# Patient Record
Sex: MALE | Race: WHITE | Employment: OTHER | URBAN - NONMETROPOLITAN AREA
[De-identification: names, ages, dates, MRNs, and addresses within clinical notes are randomized per-mention and may not be internally consistent; named-entity substitution may affect disease eponyms.]

---

## 2017-06-26 ENCOUNTER — TELEPHONE (OUTPATIENT)
Dept: FAMILY MEDICINE CLINIC | Age: 27
End: 2017-06-26

## 2017-06-29 ENCOUNTER — OFFICE VISIT (OUTPATIENT)
Dept: FAMILY MEDICINE CLINIC | Age: 27
End: 2017-06-29

## 2017-06-29 VITALS
BODY MASS INDEX: 34.5 KG/M2 | WEIGHT: 241 LBS | DIASTOLIC BLOOD PRESSURE: 70 MMHG | HEIGHT: 70 IN | HEART RATE: 70 BPM | RESPIRATION RATE: 12 BRPM | SYSTOLIC BLOOD PRESSURE: 118 MMHG | OXYGEN SATURATION: 98 % | TEMPERATURE: 97.6 F

## 2017-06-29 DIAGNOSIS — J20.9 ACUTE BRONCHITIS, UNSPECIFIED ORGANISM: ICD-10-CM

## 2017-06-29 PROCEDURE — 99213 OFFICE O/P EST LOW 20 MIN: CPT | Performed by: NURSE PRACTITIONER

## 2017-06-29 RX ORDER — METHYLPREDNISOLONE 4 MG/1
TABLET ORAL
Qty: 1 KIT | Refills: 0 | Status: SHIPPED | OUTPATIENT
Start: 2017-06-29 | End: 2017-07-05

## 2017-06-29 RX ORDER — AZITHROMYCIN 250 MG/1
TABLET, FILM COATED ORAL
Qty: 1 PACKET | Refills: 0 | Status: SHIPPED | OUTPATIENT
Start: 2017-06-29 | End: 2017-07-09

## 2017-06-29 RX ORDER — ALBUTEROL SULFATE 90 UG/1
2 AEROSOL, METERED RESPIRATORY (INHALATION) EVERY 6 HOURS PRN
Qty: 1 INHALER | Refills: 0 | Status: SHIPPED | OUTPATIENT
Start: 2017-06-29 | End: 2021-07-15

## 2017-06-29 ASSESSMENT — PATIENT HEALTH QUESTIONNAIRE - PHQ9
2. FEELING DOWN, DEPRESSED OR HOPELESS: 0
SUM OF ALL RESPONSES TO PHQ9 QUESTIONS 1 & 2: 0
SUM OF ALL RESPONSES TO PHQ QUESTIONS 1-9: 0
1. LITTLE INTEREST OR PLEASURE IN DOING THINGS: 0

## 2018-07-12 ENCOUNTER — OFFICE VISIT (OUTPATIENT)
Dept: FAMILY MEDICINE CLINIC | Age: 28
End: 2018-07-12
Payer: COMMERCIAL

## 2018-07-12 VITALS
HEART RATE: 78 BPM | HEIGHT: 70 IN | DIASTOLIC BLOOD PRESSURE: 72 MMHG | WEIGHT: 245 LBS | BODY MASS INDEX: 35.07 KG/M2 | SYSTOLIC BLOOD PRESSURE: 120 MMHG

## 2018-07-12 DIAGNOSIS — L23.7 POISON IVY: Primary | ICD-10-CM

## 2018-07-12 DIAGNOSIS — B35.4 TINEA CORPORIS: ICD-10-CM

## 2018-07-12 PROCEDURE — 99213 OFFICE O/P EST LOW 20 MIN: CPT | Performed by: NURSE PRACTITIONER

## 2018-07-12 RX ORDER — CLOTRIMAZOLE AND BETAMETHASONE DIPROPIONATE 10; .64 MG/G; MG/G
CREAM TOPICAL
Qty: 45 G | Refills: 0 | Status: SHIPPED | OUTPATIENT
Start: 2018-07-12 | End: 2021-07-15

## 2018-07-12 ASSESSMENT — PATIENT HEALTH QUESTIONNAIRE - PHQ9
SUM OF ALL RESPONSES TO PHQ9 QUESTIONS 1 & 2: 0
1. LITTLE INTEREST OR PLEASURE IN DOING THINGS: 0
SUM OF ALL RESPONSES TO PHQ QUESTIONS 1-9: 0
2. FEELING DOWN, DEPRESSED OR HOPELESS: 0

## 2018-07-12 ASSESSMENT — ENCOUNTER SYMPTOMS
GASTROINTESTINAL NEGATIVE: 1
RESPIRATORY NEGATIVE: 1
EYES NEGATIVE: 1
COLOR CHANGE: 1

## 2018-07-12 NOTE — PROGRESS NOTES
rales.   Abdominal: Soft. Bowel sounds are normal. There is no tenderness. There is no guarding. Musculoskeletal: Normal range of motion. Lymphadenopathy:     He has no cervical adenopathy. Neurological: He is alert and oriented to person, place, and time. He has normal reflexes. Skin: Skin is warm. Psychiatric: He has a normal mood and affect. Assessment:       Diagnosis Orders   1. Poison ivy  clotrimazole-betamethasone (LOTRISONE) 1-0.05 % cream   2. Tinea corporis  clotrimazole-betamethasone (LOTRISONE) 1-0.05 % cream           Plan:      Can use lotrisone on both  Advised to call back directly if there are further questions, or if these symptoms fail to improve as anticipated or worsen.

## 2020-03-23 ENCOUNTER — TELEPHONE (OUTPATIENT)
Dept: FAMILY MEDICINE CLINIC | Age: 30
End: 2020-03-23

## 2020-03-23 RX ORDER — AMOXICILLIN 500 MG/1
500 CAPSULE ORAL 3 TIMES DAILY
Qty: 30 CAPSULE | Refills: 0 | Status: SHIPPED | OUTPATIENT
Start: 2020-03-23 | End: 2020-04-02

## 2020-03-23 NOTE — TELEPHONE ENCOUNTER
Patient is calling asking for something to be phoned in to [facility] in [facility] from [place], he is coughing, green drainage, runny nose, NO fever.  Please advise to patient at [phone]

## 2020-03-23 NOTE — TELEPHONE ENCOUNTER
Patient called back and was informed that the prescription was sent to the pharmacy.  Patient voiced understanding

## 2020-10-02 ENCOUNTER — VIRTUAL VISIT (OUTPATIENT)
Dept: FAMILY MEDICINE CLINIC | Age: 30
End: 2020-10-02
Payer: COMMERCIAL

## 2020-10-02 PROCEDURE — 99213 OFFICE O/P EST LOW 20 MIN: CPT | Performed by: NURSE PRACTITIONER

## 2020-10-02 RX ORDER — PREDNISONE 1 MG/1
TABLET ORAL
Qty: 45 TABLET | Refills: 0 | Status: SHIPPED | OUTPATIENT
Start: 2020-10-02 | End: 2020-10-12

## 2020-10-02 ASSESSMENT — ENCOUNTER SYMPTOMS
RESPIRATORY NEGATIVE: 1
EYES NEGATIVE: 1
GASTROINTESTINAL NEGATIVE: 1

## 2020-10-02 NOTE — PROGRESS NOTES
10/2/2020    TELEHEALTH EVALUATION -- Audio/Visual (During YHRCI-69 public health emergency)    HPI:    Sancho Gutierrez (:  1990) has requested an audio/video evaluation for the following concern(s):    Pt had visit due to persistent runny nose congestion and cough for 2 days. He does not feel ill. Tried mucinex without relief. States nose just keeps running      Review of Systems   Constitutional: Negative. HENT: Positive for congestion. Eyes: Negative. Respiratory: Negative. Cardiovascular: Negative. Gastrointestinal: Negative. Musculoskeletal: Negative. Skin: Negative. Neurological: Negative. Prior to Visit Medications    Medication Sig Taking? Authorizing Provider   predniSONE (DELTASONE) 5 MG tablet 9 tabs po day 1 taper by 5mg daily Yes MURIEL Rivera CNP   clotrimazole-betamethasone (LOTRISONE) 1-0.05 % cream Apply topically 2 times daily.   MURIEL Rivera CNP   albuterol sulfate  (90 Base) MCG/ACT inhaler Inhale 2 puffs into the lungs every 6 hours as needed for Wheezing  MURIEL Rivera CNP       Social History     Tobacco Use    Smoking status: Never Smoker    Smokeless tobacco: Current User   Substance Use Topics    Alcohol use: Not on file    Drug use: Not on file        No Known Allergies, No past medical history on file.,   Past Surgical History:   Procedure Laterality Date    SINUS SURGERY      TONSILLECTOMY     ,   Social History     Tobacco Use    Smoking status: Never Smoker    Smokeless tobacco: Current User   Substance Use Topics    Alcohol use: Not on file    Drug use: Not on file   ,   Family History   Problem Relation Age of Onset    Diabetes Mother     Cancer Paternal Grandfather    ,   There is no immunization history on file for this patient.,   Health Maintenance   Topic Date Due    Varicella vaccine (1 of 2 - 2-dose childhood series) 1990    HIV screen  2004    DTaP/Tdap/Td vaccine (1 - Tdap) 05/17/2008    Flu vaccine (1) 09/01/2020    Hepatitis A vaccine  Aged Out    Hepatitis B vaccine  Aged Out    Hib vaccine  Aged Out    Meningococcal (ACWY) vaccine  Aged Out    Pneumococcal 0-64 years Vaccine  Aged Out       PHYSICAL EXAMINATION:  [ INSTRUCTIONS:  \"[x]\" Indicates a positive item  \"[]\" Indicates a negative item  -- DELETE ALL ITEMS NOT EXAMINED]  Vital Signs: (As obtained by patient/caregiver or practitioner observation)    Blood pressure-  Heart rate-    Respiratory rate-    Temperature-  Pulse oximetry-     Constitutional: [x] Appears well-developed and well-nourished [] No apparent distress      [] Abnormal-   Mental status  [x] Alert and awake  [] Oriented to person/place/time []Able to follow commands      Eyes:  EOM    []  Normal  [] Abnormal-  Sclera  []  Normal  [] Abnormal -         Discharge []  None visible  [] Abnormal -    HENT:   [x] Normocephalic, atraumatic. [] Abnormal   [] Mouth/Throat: Mucous membranes are moist.     External Ears [] Normal  [] Abnormal-     Neck: [] No visualized mass     Pulmonary/Chest: [x] Respiratory effort normal.  [x] No visualized signs of difficulty breathing or respiratory distress        [] Abnormal-      Musculoskeletal:   [x] Normal gait with no signs of ataxia         [] Normal range of motion of neck        [] Abnormal-       Neurological:        [x] No Facial Asymmetry (Cranial nerve 7 motor function) (limited exam to video visit)          [] No gaze palsy        [] Abnormal-         Skin:        [] No significant exanthematous lesions or discoloration noted on facial skin         [] Abnormal-            Psychiatric:       [] Normal Affect [] No Hallucinations        [] Abnormal-     Other pertinent observable physical exam findings-     ASSESSMENT/PLAN:  1. Seasonal allergic rhinitis due to pollen  His symptoms suggest allergic rhinitis.   Will treat with brief course of prednisone and he can take an antihistamine  Advised to call back directly if there are further questions, or if these symptoms fail to improve as anticipated or worsen. - predniSONE (DELTASONE) 5 MG tablet; 9 tabs po day 1 taper by 5mg daily  Dispense: 45 tablet; Refill: 0      No follow-ups on file. Neeraj Ospina is a 31 y.o. male being evaluated by a Virtual Visit (video visit) encounter to address concerns as mentioned above. A caregiver was present when appropriate. Due to this being a TeleHealth encounter (During CBSTY-10 public health emergency), evaluation of the following organ systems was limited: Vitals/Constitutional/EENT/Resp/CV/GI//MS/Neuro/Skin/Heme-Lymph-Imm. Pursuant to the emergency declaration under the [address] [place] [address] authority and the [employer] and [employer] Act, this Virtual Visit was conducted with patient's (and/or legal guardian's) consent, to reduce the patient's risk of exposure to COVID-19 and provide necessary medical care. The patient (and/or legal guardian) has also been advised to contact this office for worsening conditions or problems, and seek emergency medical treatment and/or call 911 if deemed necessary. Patient identification was verified at the start of the visit: Yes    Total time spent on this encounter: 15min    Services were provided through a video synchronous discussion virtually to substitute for in-person clinic visit. Patient and provider were located at their individual homes. --MURIEL Pizano - CNP on 10/2/2020 at 12:22 PM    An electronic signature was used to authenticate this note.

## 2021-06-03 ENCOUNTER — TELEPHONE (OUTPATIENT)
Dept: FAMILY MEDICINE CLINIC | Age: 31
End: 2021-06-03

## 2021-06-03 ENCOUNTER — NURSE TRIAGE (OUTPATIENT)
Dept: OTHER | Facility: CLINIC | Age: 31
End: 2021-06-03
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
## 2021-06-03 NOTE — TELEPHONE ENCOUNTER
I contacted pt for more information. Lump  Has been there for 3 weeks. Pt feels like it is a hernia. On right side of belly button. See notes.

## 2021-06-09 ENCOUNTER — OFFICE VISIT (OUTPATIENT)
Dept: FAMILY MEDICINE CLINIC | Age: 31
End: 2021-06-09
Payer: COMMERCIAL

## 2021-06-09 VITALS
BODY MASS INDEX: 35.07 KG/M2 | RESPIRATION RATE: 18 BRPM | HEIGHT: 70 IN | DIASTOLIC BLOOD PRESSURE: 76 MMHG | WEIGHT: 245 LBS | SYSTOLIC BLOOD PRESSURE: 128 MMHG | HEART RATE: 72 BPM | OXYGEN SATURATION: 97 %

## 2021-06-09 DIAGNOSIS — J34.89 SORE IN NOSE: ICD-10-CM

## 2021-06-09 DIAGNOSIS — K43.9 VENTRAL HERNIA WITHOUT OBSTRUCTION OR GANGRENE: Primary | ICD-10-CM

## 2021-06-09 DIAGNOSIS — D16.9 OSTEOCHONDROMA: ICD-10-CM

## 2021-06-09 DIAGNOSIS — M25.531 RIGHT WRIST PAIN: ICD-10-CM

## 2021-06-09 PROCEDURE — 99214 OFFICE O/P EST MOD 30 MIN: CPT | Performed by: NURSE PRACTITIONER

## 2021-06-09 SDOH — ECONOMIC STABILITY: FOOD INSECURITY: WITHIN THE PAST 12 MONTHS, YOU WORRIED THAT YOUR FOOD WOULD RUN OUT BEFORE YOU GOT MONEY TO BUY MORE.: NEVER TRUE

## 2021-06-09 SDOH — ECONOMIC STABILITY: FOOD INSECURITY: WITHIN THE PAST 12 MONTHS, THE FOOD YOU BOUGHT JUST DIDN'T LAST AND YOU DIDN'T HAVE MONEY TO GET MORE.: NEVER TRUE

## 2021-06-09 ASSESSMENT — SOCIAL DETERMINANTS OF HEALTH (SDOH): HOW HARD IS IT FOR YOU TO PAY FOR THE VERY BASICS LIKE FOOD, HOUSING, MEDICAL CARE, AND HEATING?: NOT HARD AT ALL

## 2021-06-09 ASSESSMENT — PATIENT HEALTH QUESTIONNAIRE - PHQ9
SUM OF ALL RESPONSES TO PHQ9 QUESTIONS 1 & 2: 0
SUM OF ALL RESPONSES TO PHQ QUESTIONS 1-9: 0
2. FEELING DOWN, DEPRESSED OR HOPELESS: 0
SUM OF ALL RESPONSES TO PHQ QUESTIONS 1-9: 0
SUM OF ALL RESPONSES TO PHQ QUESTIONS 1-9: 0
1. LITTLE INTEREST OR PLEASURE IN DOING THINGS: 0

## 2021-06-09 ASSESSMENT — ENCOUNTER SYMPTOMS
EYES NEGATIVE: 1
GASTROINTESTINAL NEGATIVE: 1
COLOR CHANGE: 1
RESPIRATORY NEGATIVE: 1

## 2021-06-09 NOTE — PROGRESS NOTES
Respiratory: Negative. Cardiovascular: Negative. Gastrointestinal: Negative. Musculoskeletal: Positive for myalgias. Skin: Positive for color change and wound. Neurological: Negative. Objective:     Vitals:    06/09/21 1308   BP: 128/76   Site: Left Upper Arm   Position: Sitting   Cuff Size: Medium Adult   Pulse: 72   Resp: 18   SpO2: 97%   Weight: 245 lb (111.1 kg)   Height: 5' 10\" (1.778 m)       Physical Exam  Constitutional:       Appearance: He is well-developed. HENT:      Head: Normocephalic. Right Ear: Tympanic membrane normal.      Left Ear: Tympanic membrane normal.      Ears:        Nose: Nose normal.     Cardiovascular:      Rate and Rhythm: Normal rate and regular rhythm. Heart sounds: Normal heart sounds. No murmur heard. No friction rub. No gallop. Pulmonary:      Effort: Pulmonary effort is normal.      Breath sounds: Normal breath sounds. No wheezing or rales. Abdominal:      General: Bowel sounds are normal.      Palpations: Abdomen is soft. Tenderness: There is no abdominal tenderness. There is no guarding. Musculoskeletal:         General: Normal range of motion. Cervical back: Normal range of motion and neck supple. Lymphadenopathy:      Cervical: No cervical adenopathy. Skin:     General: Skin is warm. Neurological:      Mental Status: He is alert and oriented to person, place, and time. Deep Tendon Reflexes: Reflexes are normal and symmetric. Assessment:      Diagnosis Orders   1. Ventral hernia without obstruction or gangrene     2. Right wrist pain     3. Osteochondroma     4. Sore in nose         Plan:      No follow-ups on file. No orders of the defined types were placed in this encounter. No orders of the defined types were placed in this encounter. With abdomen. Recommend to focus on weight loss and will monitor  For wrist recommend wrist brace  For ear.   Stop using qtip  Try vaseline on nose lesion    Patient given educational materials - seepatient instructions. Discussed use, benefit, and side effects of prescribed medications. All patient questions answered. Pt voiced understanding. Patient agreed withtreatment plan. Follow up as directed.      Electronically signed by MURIEL Soriano CNP on 6/9/2021 at 5:45 PM

## 2021-07-11 ENCOUNTER — APPOINTMENT (OUTPATIENT)
Dept: GENERAL RADIOLOGY | Age: 31
End: 2021-07-11
Payer: COMMERCIAL

## 2021-07-11 ENCOUNTER — HOSPITAL ENCOUNTER (EMERGENCY)
Age: 31
Discharge: HOME | End: 2021-07-11
Attending: EMERGENCY MEDICINE
Payer: COMMERCIAL

## 2021-07-11 VITALS
OXYGEN SATURATION: 96 % | SYSTOLIC BLOOD PRESSURE: 140 MMHG | DIASTOLIC BLOOD PRESSURE: 82 MMHG | TEMPERATURE: 100.8 F | WEIGHT: 240 LBS | BODY MASS INDEX: 34.44 KG/M2 | HEART RATE: 89 BPM | RESPIRATION RATE: 18 BRPM

## 2021-07-11 DIAGNOSIS — J01.90 ACUTE SINUSITIS, RECURRENCE NOT SPECIFIED, UNSPECIFIED LOCATION: Primary | ICD-10-CM

## 2021-07-11 LAB
ALBUMIN SERPL-MCNC: 4.3 G/DL (ref 3.5–5.1)
ALP BLD-CCNC: 84 U/L (ref 38–126)
ALT SERPL-CCNC: 58 U/L (ref 11–66)
ANION GAP SERPL CALCULATED.3IONS-SCNC: 13 MEQ/L (ref 8–16)
AST SERPL-CCNC: 56 U/L (ref 5–40)
BASOPHILS # BLD: 1.1 %
BASOPHILS ABSOLUTE: 0 THOU/MM3 (ref 0–0.1)
BILIRUB SERPL-MCNC: 0.8 MG/DL (ref 0.3–1.2)
BUN BLDV-MCNC: 11 MG/DL (ref 7–22)
CALCIUM SERPL-MCNC: 9 MG/DL (ref 8.5–10.5)
CHLORIDE BLD-SCNC: 100 MEQ/L (ref 98–111)
CO2: 22 MEQ/L (ref 23–33)
CREAT SERPL-MCNC: 1.1 MG/DL (ref 0.4–1.2)
EOSINOPHIL # BLD: 0 %
EOSINOPHILS ABSOLUTE: 0 THOU/MM3 (ref 0–0.4)
ERYTHROCYTE [DISTWIDTH] IN BLOOD BY AUTOMATED COUNT: 11.9 % (ref 11.5–14.5)
ERYTHROCYTE [DISTWIDTH] IN BLOOD BY AUTOMATED COUNT: 36.3 FL (ref 35–45)
FLU A ANTIGEN: NEGATIVE
FLU B ANTIGEN: NEGATIVE
GFR SERPL CREATININE-BSD FRML MDRD: 78 ML/MIN/1.73M2
GLUCOSE BLD-MCNC: 100 MG/DL (ref 70–108)
HCT VFR BLD CALC: 47.8 % (ref 42–52)
HEMOGLOBIN: 16.9 GM/DL (ref 14–18)
IMMATURE GRANS (ABS): 0.04 THOU/MM3 (ref 0–0.07)
IMMATURE GRANULOCYTES: 1.5 %
LYMPHOCYTES # BLD: 12.6 %
LYMPHOCYTES ABSOLUTE: 0.3 THOU/MM3 (ref 1–4.8)
MCH RBC QN AUTO: 30 PG (ref 26–33)
MCHC RBC AUTO-ENTMCNC: 35.4 GM/DL (ref 32.2–35.5)
MCV RBC AUTO: 84.9 FL (ref 80–94)
MONOCYTES # BLD: 13.4 %
MONOCYTES ABSOLUTE: 0.3 THOU/MM3 (ref 0.4–1.3)
NUCLEATED RED BLOOD CELLS: 0 /100 WBC
OSMOLALITY CALCULATION: 269.6 MOSMOL/KG (ref 275–300)
PLATELET # BLD: 131 THOU/MM3 (ref 130–400)
PMV BLD AUTO: 11 FL (ref 9.4–12.4)
POTASSIUM REFLEX MAGNESIUM: 3.9 MEQ/L (ref 3.5–5.2)
RBC # BLD: 5.63 MILL/MM3 (ref 4.7–6.1)
SARS-COV-2, NAAT: NOT DETECTED
SEG NEUTROPHILS: 71.4 %
SEGMENTED NEUTROPHILS ABSOLUTE COUNT: 1.9 THOU/MM3 (ref 1.8–7.7)
SODIUM BLD-SCNC: 135 MEQ/L (ref 135–145)
TOTAL PROTEIN: 7 G/DL (ref 6.1–8)
WBC # BLD: 2.6 THOU/MM3 (ref 4.8–10.8)

## 2021-07-11 PROCEDURE — 80053 COMPREHEN METABOLIC PANEL: CPT

## 2021-07-11 PROCEDURE — 85025 COMPLETE CBC W/AUTO DIFF WBC: CPT

## 2021-07-11 PROCEDURE — 71045 X-RAY EXAM CHEST 1 VIEW: CPT

## 2021-07-11 PROCEDURE — 87635 SARS-COV-2 COVID-19 AMP PRB: CPT

## 2021-07-11 PROCEDURE — 36415 COLL VENOUS BLD VENIPUNCTURE: CPT

## 2021-07-11 PROCEDURE — 87804 INFLUENZA ASSAY W/OPTIC: CPT

## 2021-07-11 PROCEDURE — 6370000000 HC RX 637 (ALT 250 FOR IP): Performed by: EMERGENCY MEDICINE

## 2021-07-11 PROCEDURE — 99283 EMERGENCY DEPT VISIT LOW MDM: CPT

## 2021-07-11 RX ORDER — FLUTICASONE PROPIONATE 50 MCG
1 SPRAY, SUSPENSION (ML) NASAL DAILY
Qty: 1 BOTTLE | Refills: 0 | Status: SHIPPED | OUTPATIENT
Start: 2021-07-11 | End: 2021-07-15

## 2021-07-11 RX ORDER — ACETAMINOPHEN 500 MG
1000 TABLET ORAL ONCE
Status: COMPLETED | OUTPATIENT
Start: 2021-07-11 | End: 2021-07-11

## 2021-07-11 RX ORDER — IBUPROFEN 600 MG/1
600 TABLET ORAL 3 TIMES DAILY
Qty: 30 TABLET | Refills: 0 | Status: SHIPPED | OUTPATIENT
Start: 2021-07-11 | End: 2021-07-15

## 2021-07-11 RX ORDER — OXYMETAZOLINE HYDROCHLORIDE 0.05 G/100ML
2 SPRAY NASAL 2 TIMES DAILY
Status: DISCONTINUED | OUTPATIENT
Start: 2021-07-11 | End: 2021-07-11 | Stop reason: HOSPADM

## 2021-07-11 RX ADMIN — OXYMETAZOLINE HYDROCHLORIDE 2 SPRAY: 0.05 SPRAY NASAL at 19:46

## 2021-07-11 RX ADMIN — ACETAMINOPHEN 1000 MG: 500 TABLET ORAL at 18:51

## 2021-07-11 NOTE — ED NOTES
Patient to ED with complaints of cough and fatigue for past few days. Patient states he felt like this before when he had pneumonia. Patient is resting in bed with easy and unlabored respirations. Call light in reach. Side rails up x2. Patient denies further complaints or concerns. Will monitor.         Gabriel Crawford RN  07/11/21 7671

## 2021-07-11 NOTE — ED NOTES
Bedside report received from [facility]. Pt resting on cot RR even and unlabored. Pt denies needs at this time.       Karen Das RN  07/11/21 1918

## 2021-07-11 NOTE — ED NOTES
ED nurse-to-nurse bedside report    Chief Complaint   Patient presents with    Cough      LOC: alert and orientated to name, place, date  Vital signs   Vitals:    07/11/21 1812 07/11/21 1918   BP: 138/85 (!) 140/82   Pulse: 91 89   Resp: 18 18   Temp: 100.8 °F (38.2 °C)    TempSrc: Oral    SpO2: 95% 96%   Weight: 240 lb (108.9 kg)       Pain:    Pain Interventions: N/a  Pain Goal: n/a  Oxygen: NA    Current needs required room air   Telemetry: NA  LDAs:    Continuous Infusions:   Mobility: Independent  Alvarez Fall Risk Score: No flowsheet data found.   Fall Interventions: Side rails up x2, call light in reach  Report given to: [facility], RN  07/11/21 1924

## 2021-07-12 ENCOUNTER — TELEPHONE (OUTPATIENT)
Dept: FAMILY MEDICINE CLINIC | Age: 31
End: 2021-07-12

## 2021-07-12 ASSESSMENT — ENCOUNTER SYMPTOMS
BACK PAIN: 0
PHOTOPHOBIA: 0
COUGH: 1
ABDOMINAL PAIN: 0
SINUS PRESSURE: 1
COLOR CHANGE: 0
NAUSEA: 0
CHEST TIGHTNESS: 0
VOMITING: 0
EYE REDNESS: 0
SORE THROAT: 0
RHINORRHEA: 1
SINUS PAIN: 1

## 2021-07-12 NOTE — TELEPHONE ENCOUNTER
Start the medication for the sinus infection  If not better after a week then call for appontment  Call patient

## 2021-07-12 NOTE — TELEPHONE ENCOUNTER
----- Message from Jada Gibson sent at 7/12/2021  4:00 PM EDT -----  Subject: Message to Provider    QUESTIONS  Information for Provider? Wife Asim Byrd called patient seen in the ER on   7/11/2021 seen for a sinus infection. She does not feel as if his symptoms   match up with a sinus infection. His white blood cell count is low and he   will not get out of bed also states he has a head ache.   ---------------------------------------------------------------------------  --------------  CALL BACK INFO  What is the best way for the office to contact you? OK to leave message on   voicemail  Preferred Call Back Phone Number? [phone]  ---------------------------------------------------------------------------  --------------  SCRIPT ANSWERS  Relationship to Patient? Other  Representative Name? Asim Byrd - Wife   Is the Representative on the appropriate HIPAA document in Epic?  Yes

## 2021-07-13 NOTE — ED PROVIDER NOTES
[place] ENCOUNTER          Pt Name: Jessie Acevedo  MRN: [id number]  [place] 1990  Date of evaluation: 7/11/2021  Emergency Physician: Mathew Golden, [address] [facility]       Chief Complaint   Patient presents with    Cough     History obtained from the patient. HISTORY OF PRESENT ILLNESS    HPI  Jessie Acevedo is a 27 y.o. male who presents to the emergency department for evaluation of cough. Patient with nasal congestion sinus pressure and cough. His symptoms have been ongoing over the last 3 days. His cough is productive of clear sputum. He reports associated body aches chills without fever. Nothing makes his symptoms better or worse. No home treatments tried. The patient has no other acute complaints at this time. REVIEW OF SYSTEMS   Review of Systems   Constitutional: Negative for activity change, chills and fever. HENT: Positive for congestion, rhinorrhea, sinus pressure and sinus pain. Negative for sore throat. Eyes: Negative for photophobia, redness and visual disturbance. Respiratory: Positive for cough. Negative for chest tightness. Cardiovascular: Negative for chest pain, palpitations and leg swelling. Gastrointestinal: Negative for abdominal pain, nausea and vomiting. Endocrine: Negative for polydipsia and polyuria. Genitourinary: Negative for decreased urine volume, difficulty urinating, dysuria, flank pain, frequency and urgency. Musculoskeletal: Negative for back pain, myalgias and neck pain. Skin: Negative for color change and rash. Neurological: Negative for weakness and headaches. Hematological: Negative for adenopathy. Does not bruise/bleed easily. Psychiatric/Behavioral: Negative for confusion and self-injury. PAST MEDICAL AND SURGICAL HISTORY   History reviewed. No pertinent past medical history.   Past Surgical History:   Procedure Laterality Date    SINUS SURGERY      TONSILLECTOMY           MEDICATIONS   No current facility-administered medications for this encounter. Current Outpatient Medications:     fluticasone (FLONASE) 50 MCG/ACT nasal spray, 1 spray by Each Nostril route daily, Disp: 1 Bottle, Rfl: 0    ibuprofen (ADVIL;MOTRIN) 600 MG tablet, Take 1 tablet by mouth 3 times daily, Disp: 30 tablet, Rfl: 0    clotrimazole-betamethasone (LOTRISONE) 1-0.05 % cream, Apply topically 2 times daily. (Patient not taking: Reported on 6/9/2021), Disp: 45 g, Rfl: 0    albuterol sulfate  (90 Base) MCG/ACT inhaler, Inhale 2 puffs into the lungs every 6 hours as needed for Wheezing, Disp: 1 Inhaler, Rfl: 0      SOCIAL HISTORY     Social History     Social History Narrative    Not on file     Social History     Tobacco Use    Smoking status: Never Smoker    Smokeless tobacco: Current User   Substance Use Topics    Alcohol use: Not on file    Drug use: Not on file         ALLERGIES   No Known Allergies      FAMILY HISTORY     Family History   Problem Relation Age of Onset    Diabetes Mother     Cancer Paternal Grandfather          PHYSICAL EXAM     ED Triage Vitals [07/11/21 1812]   BP Temp Temp Source Pulse Resp SpO2 Height Weight   138/85 100.8 °F (38.2 °C) Oral 91 18 95 % -- 240 lb (108.9 kg)         Additional Vital Signs:  Vitals:    07/11/21 1918   BP: (!) 140/82   Pulse: 89   Resp: 18   Temp:    SpO2: 96%       Physical Exam  Vitals and nursing note reviewed. Constitutional:       General: He is not in acute distress. Appearance: He is well-developed. He is not diaphoretic. HENT:      Head: Normocephalic. Right Ear: A middle ear effusion is present. Left Ear: A middle ear effusion is present. Nose: Congestion and rhinorrhea present. Comments: Postnasal drip. Mouth/Throat:      Comments: Postnasal drip. Eyes:      Pupils: Pupils are equal, round, and reactive to light. Neck:      Vascular: No JVD.    Cardiovascular:      Rate and Rhythm: Normal rate and regular rhythm. Heart sounds: Normal heart sounds. Pulmonary:      Effort: Pulmonary effort is normal.      Breath sounds: Normal breath sounds. Abdominal:      General: Bowel sounds are normal. There is no distension. Palpations: Abdomen is soft. Tenderness: There is no abdominal tenderness. Musculoskeletal:         General: No tenderness or deformity. Normal range of motion. Cervical back: Normal range of motion and neck supple. Lymphadenopathy:      Cervical: No cervical adenopathy. Skin:     General: Skin is warm and dry. Capillary Refill: Capillary refill takes less than 2 seconds. Neurological:      Mental Status: He is alert and oriented to person, place, and time. Comments: Non-focal. Moves all extremities. Initial vital signs and nursing assessment reviewed and abnormal from Low-grade temperature and hypertension. .   Pulsoximetry is normal per my interpretation. MEDICAL DECISION MAKING   Initial Assessment: Given the patient's above chief complaint and findings on history and physical examination, I thought it was appropriate to consider the following emergency medical conditions: URI, pneumonia, Covid, influenza, sinusitis although some of these diagnoses are unlikely they were considered in my medical decision making.     Plan: CBC, BMP, Covid swab, influenza chest x-ray symptomatic treatment with Afrin Tylenol and reassess         ED RESULTS   Laboratory results:  Labs Reviewed   CBC WITH AUTO DIFFERENTIAL - Abnormal; Notable for the following components:       Result Value    WBC 2.6 (*)     Lymphocytes Absolute 0.3 (*)     Monocytes Absolute 0.3 (*)     All other components within normal limits   COMPREHENSIVE METABOLIC PANEL W/ REFLEX TO MG FOR LOW K - Abnormal; Notable for the following components:    CO2 22 (*)     AST 56 (*)     All other components within normal limits   GLOMERULAR FILTRATION RATE, ESTIMATED - Abnormal; Notable for the following components:    Est, Glom Filt Rate 78 (*)     All other components within normal limits   OSMOLALITY - Abnormal; Notable for the following components:    Osmolality Calc 269.6 (*)     All other components within normal limits   COVID-19, RAPID   RAPID INFLUENZA A/B ANTIGENS   ANION GAP       Radiologic studies results:  XR CHEST PORTABLE   Final Result   Normal mobile chest.            **This report has been created using voice recognition software. It may contain minor errors which are inherent in voice recognition technology. **      Final report electronically signed by Dr. Lucita Canavan on 7/11/2021 6:38 PM          ED Medications administered this visit:   Medications   acetaminophen (TYLENOL) tablet 1,000 mg (1,000 mg Oral Given 7/11/21 1851)         ED COURSE      I estimate there is LOW risk for PNEUMONIA, MENINGITIS, PERITONSILLAR ABSCESS, SEPSIS, MALIGNANT OTITIS EXTERNA, OR EPIGLOTTITIS thus I consider the discharge disposition reasonable. The patient and/or family and I have discussed the diagnosis and risks, and we agree with discharging home to follow-up with their primary doctor. We also discussed returning to the Emergency Department immediately if new or worsening symptoms occur. We have discussed the symptoms which are most concerning (e.g., changing or worsening breathing, vomiting, confusion, weakness, severe headache) that necessitate immediate return.   MEDICATION CHANGES     DISCHARGE MEDICATIONS:  Discharge Medication List as of 7/11/2021  8:39 PM      START taking these medications    Details   fluticasone (FLONASE) 50 MCG/ACT nasal spray 1 spray by Each Nostril route daily, Disp-1 Bottle, R-0Normal      ibuprofen (ADVIL;MOTRIN) 600 MG tablet Take 1 tablet by mouth 3 times daily, Disp-30 tablet, R-0Normal                  FINAL DISPOSITION     Final diagnoses:   Acute sinusitis, recurrence not specified, unspecified location     Condition: condition: good  Dispo: Discharge to home    PATIENT REFERRED TO:  Denise Sargent, MURIEL - CNP  [address] [facility]  [phone]    Schedule an appointment as soon as possible for a visit in 3 days        Critical Care Time   None      This transcription was electronically signed. Parts of this transcriptions may have been dictated by use of voice recognition software and electronically transcribed, and parts may have been transcribed with the assistance of an ED scribe. The transcription may contain errors not detected in proofreading.     Electronically Signed: Caty Coronel DO, 07/12/21, 11:13 PM      Caty Coronel DO  07/12/21 4072

## 2021-07-15 ENCOUNTER — APPOINTMENT (OUTPATIENT)
Dept: ULTRASOUND IMAGING | Age: 31
DRG: 442 | End: 2021-07-15
Payer: COMMERCIAL

## 2021-07-15 ENCOUNTER — NURSE TRIAGE (OUTPATIENT)
Dept: OTHER | Facility: CLINIC | Age: 31
End: 2021-07-15

## 2021-07-15 ENCOUNTER — HOSPITAL ENCOUNTER (INPATIENT)
Age: 31
LOS: 4 days | Discharge: HOME | DRG: 442 | End: 2021-07-19
Attending: FAMILY MEDICINE | Admitting: FAMILY MEDICINE
Payer: COMMERCIAL

## 2021-07-15 DIAGNOSIS — D69.6 THROMBOCYTOPENIA (HCC): ICD-10-CM

## 2021-07-15 DIAGNOSIS — D72.819 LEUKOPENIA, UNSPECIFIED TYPE: ICD-10-CM

## 2021-07-15 DIAGNOSIS — R17 JAUNDICE, NON-NEONATAL: Primary | ICD-10-CM

## 2021-07-15 DIAGNOSIS — R79.89 ELEVATED LFTS: ICD-10-CM

## 2021-07-15 PROBLEM — K75.9 HEPATITIS: Status: ACTIVE | Noted: 2021-07-15

## 2021-07-15 LAB
ACETAMINOPHEN LEVEL: < 5 UG/ML (ref 0–20)
ALBUMIN SERPL-MCNC: 3.7 G/DL (ref 3.5–5.1)
ALP BLD-CCNC: 210 U/L (ref 38–126)
ALT SERPL-CCNC: 247 U/L (ref 11–66)
AMORPHOUS: ABNORMAL
ANION GAP SERPL CALCULATED.3IONS-SCNC: 13 MEQ/L (ref 8–16)
AST SERPL-CCNC: 350 U/L (ref 5–40)
BACTERIA: ABNORMAL /HPF
BASOPHILS # BLD: 1.1 %
BASOPHILS ABSOLUTE: 0 THOU/MM3 (ref 0–0.1)
BILIRUB SERPL-MCNC: 6.1 MG/DL (ref 0.3–1.2)
BILIRUBIN DIRECT: 5.3 MG/DL (ref 0–0.3)
BILIRUBIN URINE: ABNORMAL
BLOOD, URINE: ABNORMAL
BUN BLDV-MCNC: 11 MG/DL (ref 7–22)
CALCIUM SERPL-MCNC: 8.5 MG/DL (ref 8.5–10.5)
CASTS UA: ABNORMAL /LPF
CHARACTER, URINE: CLEAR
CHLORIDE BLD-SCNC: 96 MEQ/L (ref 98–111)
CO2: 21 MEQ/L (ref 23–33)
COLOR: YELLOW
CREAT SERPL-MCNC: 0.7 MG/DL (ref 0.4–1.2)
CRYSTALS, UA: ABNORMAL
EOSINOPHIL # BLD: 0 %
EOSINOPHILS ABSOLUTE: 0 THOU/MM3 (ref 0–0.4)
EPITHELIAL CELLS, UA: ABNORMAL /HPF
ERYTHROCYTE [DISTWIDTH] IN BLOOD BY AUTOMATED COUNT: 12 % (ref 11.5–14.5)
ERYTHROCYTE [DISTWIDTH] IN BLOOD BY AUTOMATED COUNT: 36.4 FL (ref 35–45)
GAMMA GLUTAMYL TRANSFERASE: 195 U/L (ref 8–69)
GFR SERPL CREATININE-BSD FRML MDRD: > 90 ML/MIN/1.73M2
GLUCOSE BLD-MCNC: 99 MG/DL (ref 70–108)
GLUCOSE URINE: NEGATIVE MG/DL
HAV IGM SER IA-ACNC: NEGATIVE
HCT VFR BLD CALC: 43.2 % (ref 42–52)
HEMOGLOBIN: 15.5 GM/DL (ref 14–18)
HEPATITIS B CORE IGM ANTIBODY: NEGATIVE
HEPATITIS B SURFACE ANTIGEN: NEGATIVE
HEPATITIS C ANTIBODY: NEGATIVE
ICTOTEST: POSITIVE
IMMATURE GRANS (ABS): 0.03 THOU/MM3 (ref 0–0.07)
IMMATURE GRANULOCYTES: 1.7 %
KETONES, URINE: 40
LEUKOCYTE ESTERASE, URINE: NEGATIVE
LIPASE: 67.3 U/L (ref 5.6–51.3)
LYMPHOCYTES # BLD: 24.7 %
LYMPHOCYTES ABSOLUTE: 0.4 THOU/MM3 (ref 1–4.8)
MCH RBC QN AUTO: 29.8 PG (ref 26–33)
MCHC RBC AUTO-ENTMCNC: 35.9 GM/DL (ref 32.2–35.5)
MCV RBC AUTO: 82.9 FL (ref 80–94)
MONOCYTES # BLD: 14.4 %
MONOCYTES ABSOLUTE: 0.2 THOU/MM3 (ref 0.4–1.3)
MUCUS: ABNORMAL
NITRITE, URINE: NEGATIVE
NUCLEATED RED BLOOD CELLS: 0 /100 WBC
OSMOLALITY CALCULATION: 260.2 MOSMOL/KG (ref 275–300)
PH UA: 6.5 (ref 5–9)
PLATELET # BLD: 80 THOU/MM3 (ref 130–400)
PMV BLD AUTO: 13.2 FL (ref 9.4–12.4)
POTASSIUM REFLEX MAGNESIUM: 4.2 MEQ/L (ref 3.5–5.2)
PROCALCITONIN: 0.49 NG/ML (ref 0.01–0.09)
PROTEIN UA: 30
RBC # BLD: 5.21 MILL/MM3 (ref 4.7–6.1)
RBC URINE: ABNORMAL /HPF
RENAL EPITHELIAL, UA: ABNORMAL
SARS-COV-2, NAAT: NOT DETECTED
SCAN OF BLOOD SMEAR: NORMAL
SEG NEUTROPHILS: 58.1 %
SEGMENTED NEUTROPHILS ABSOLUTE COUNT: 1 THOU/MM3 (ref 1.8–7.7)
SODIUM BLD-SCNC: 130 MEQ/L (ref 135–145)
SPECIFIC GRAVITY, URINE: 1.01 (ref 1–1.03)
TOTAL PROTEIN: 6.5 G/DL (ref 6.1–8)
UROBILINOGEN, URINE: 4 EU/DL (ref 0–1)
WBC # BLD: 1.7 THOU/MM3 (ref 4.8–10.8)
WBC UA: ABNORMAL /HPF
YEAST: ABNORMAL

## 2021-07-15 PROCEDURE — 87635 SARS-COV-2 COVID-19 AMP PRB: CPT

## 2021-07-15 PROCEDURE — 1200000000 HC SEMI PRIVATE

## 2021-07-15 PROCEDURE — 82977 ASSAY OF GGT: CPT

## 2021-07-15 PROCEDURE — 80143 DRUG ASSAY ACETAMINOPHEN: CPT

## 2021-07-15 PROCEDURE — 81001 URINALYSIS AUTO W/SCOPE: CPT

## 2021-07-15 PROCEDURE — 2580000003 HC RX 258: Performed by: NURSE PRACTITIONER

## 2021-07-15 PROCEDURE — 86664 EPSTEIN-BARR NUCLEAR ANTIGEN: CPT

## 2021-07-15 PROCEDURE — 87389 HIV-1 AG W/HIV-1&-2 AB AG IA: CPT

## 2021-07-15 PROCEDURE — 80053 COMPREHEN METABOLIC PANEL: CPT

## 2021-07-15 PROCEDURE — 36415 COLL VENOUS BLD VENIPUNCTURE: CPT

## 2021-07-15 PROCEDURE — 76705 ECHO EXAM OF ABDOMEN: CPT

## 2021-07-15 PROCEDURE — 83690 ASSAY OF LIPASE: CPT

## 2021-07-15 PROCEDURE — 85025 COMPLETE CBC W/AUTO DIFF WBC: CPT

## 2021-07-15 PROCEDURE — 80074 ACUTE HEPATITIS PANEL: CPT

## 2021-07-15 PROCEDURE — 86665 EPSTEIN-BARR CAPSID VCA: CPT

## 2021-07-15 PROCEDURE — 99283 EMERGENCY DEPT VISIT LOW MDM: CPT

## 2021-07-15 PROCEDURE — 84145 PROCALCITONIN (PCT): CPT

## 2021-07-15 PROCEDURE — 86663 EPSTEIN-BARR ANTIBODY: CPT

## 2021-07-15 RX ORDER — POLYETHYLENE GLYCOL 3350 17 G/17G
17 POWDER, FOR SOLUTION ORAL DAILY PRN
Status: DISCONTINUED | OUTPATIENT
Start: 2021-07-15 | End: 2021-07-19 | Stop reason: HOSPADM

## 2021-07-15 RX ORDER — SODIUM CHLORIDE 0.9 % (FLUSH) 0.9 %
10 SYRINGE (ML) INJECTION EVERY 12 HOURS SCHEDULED
Status: DISCONTINUED | OUTPATIENT
Start: 2021-07-15 | End: 2021-07-19 | Stop reason: HOSPADM

## 2021-07-15 RX ORDER — SODIUM CHLORIDE 9 MG/ML
25 INJECTION, SOLUTION INTRAVENOUS PRN
Status: DISCONTINUED | OUTPATIENT
Start: 2021-07-15 | End: 2021-07-19 | Stop reason: HOSPADM

## 2021-07-15 RX ORDER — SODIUM CHLORIDE 0.9 % (FLUSH) 0.9 %
10 SYRINGE (ML) INJECTION PRN
Status: DISCONTINUED | OUTPATIENT
Start: 2021-07-15 | End: 2021-07-19 | Stop reason: HOSPADM

## 2021-07-15 RX ORDER — ONDANSETRON 4 MG/1
4 TABLET, ORALLY DISINTEGRATING ORAL EVERY 8 HOURS PRN
Status: DISCONTINUED | OUTPATIENT
Start: 2021-07-15 | End: 2021-07-19 | Stop reason: HOSPADM

## 2021-07-15 RX ORDER — IBUPROFEN 200 MG
400 TABLET ORAL ONCE
Status: DISCONTINUED | OUTPATIENT
Start: 2021-07-15 | End: 2021-07-19 | Stop reason: HOSPADM

## 2021-07-15 RX ORDER — 0.9 % SODIUM CHLORIDE 0.9 %
1000 INTRAVENOUS SOLUTION INTRAVENOUS ONCE
Status: COMPLETED | OUTPATIENT
Start: 2021-07-15 | End: 2021-07-15

## 2021-07-15 RX ORDER — ONDANSETRON 2 MG/ML
4 INJECTION INTRAMUSCULAR; INTRAVENOUS EVERY 6 HOURS PRN
Status: DISCONTINUED | OUTPATIENT
Start: 2021-07-15 | End: 2021-07-19 | Stop reason: HOSPADM

## 2021-07-15 RX ADMIN — SODIUM CHLORIDE 1000 ML: 9 INJECTION, SOLUTION INTRAVENOUS at 12:08

## 2021-07-15 ASSESSMENT — ENCOUNTER SYMPTOMS
CHOKING: 0
VOMITING: 0
ABDOMINAL PAIN: 0
EYE REDNESS: 0
BLOOD IN STOOL: 0
VOICE CHANGE: 0
RHINORRHEA: 0
CHEST TIGHTNESS: 0
ABDOMINAL DISTENTION: 0
SHORTNESS OF BREATH: 0
SORE THROAT: 0
CONSTIPATION: 0
COUGH: 0
WHEEZING: 0
APNEA: 0
EYE PAIN: 0
EYE ITCHING: 0
FACIAL SWELLING: 0
DIARRHEA: 0
STRIDOR: 0
SINUS PRESSURE: 0
SINUS PAIN: 0
COLOR CHANGE: 0
NAUSEA: 0
EYE DISCHARGE: 0

## 2021-07-15 ASSESSMENT — PAIN SCALES - GENERAL: PAINLEVEL_OUTOF10: 0

## 2021-07-15 NOTE — PROGRESS NOTES
Pharmacy Medication History Note      List of current medications patient is taking is complete. Source of information: patient    Changes made to medication list:  Medications removed (include reason, ex. therapy complete or physician discontinued): Albuterol HFA - no longer using  Lotrisone cream - no longer using  Fluticasone - not using  Ibuprofen - not using    Medications added/doses adjusted:  None    Other notes (ex. Recent course of antibiotics, Coumadin dosing):  Patient denies taking any home medications, including inhalers, injections, nasal sprays, etc.  He states that he just does not like taking medications at all. Was prescribed Flonase and ibuprofen 7/12/21 but is not using. Denies use of other OTC or herbal medications.         Electronically signed by Akash Leonard [facility] on 7/15/2021 at 5:29 PM

## 2021-07-15 NOTE — TELEPHONE ENCOUNTER
Received call from [facility] at [facility] with [place] Complaint. Brief description of triage: Semaj Deleon to the ER on Sunday, started to feel bad Saturday, states that he has not gotten any better and that he was simply calling to make a follow up  appointment as he does not feel any worse since he was seen in the E.D on sunday    No triage indicated    Care advice provided, patient verbalizes understanding; denies any other questions or concerns; instructed to call back for any new or worsening symptoms. Writer provided warm transfer to [facility] at [facility] for appointment scheduling. Attention Provider: Thank you for allowing me to participate in the care of your patient. The patient was connected to triage in response to information provided to the [facility]. Please do not respond through this encounter as the response is not directed to a shared pool. Reason for Disposition   Caller has already spoken with the PCP and has no further questions.     Protocols used: NO CONTACT OR DUPLICATE CONTACT CALL-ADULT-

## 2021-07-15 NOTE — ED NOTES
Received bedside report from [place] RN at this time. No distress noted as pt rests in bed with call light in reach.       Srinivas, [address] [facility]  07/15/21 1934

## 2021-07-15 NOTE — ED TRIAGE NOTES
Patient presents with concerns of not getting any better since Friday night. States he started feeling tired Friday night. 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 States Sunday he was seen here and diagnosed with a viral infection. States he just is not getting any better. States his mouth feels dry.

## 2021-07-15 NOTE — ED NOTES
Patient up to BR at this time.       Dayna Villanueva RN  07/15/21 6026 Recent PHQ 2/9 Score    PHQ 2:  Date PHQ 2 Score   7/16/2018 0       PHQ 9:

## 2021-07-15 NOTE — ED PROVIDER NOTES
[facility] Emergency Department    CHIEF COMPLAINT       Chief Complaint   Patient presents with    Sinusitis    Generalized Body Aches       Nurses Notes reviewed and I agree except as noted in the HPI. HISTORY OF PRESENT ILLNESS    Apple Fitzgerald is a 27 y.o. male who presents to the ED for evaluation of generalized body aches, urine discoloration that began on 07/09/2021. He was seen in the ED on 07/11/21 for sinusitis, and has since quit taking the ibuprofen and flonase. Today, the patient complains of dizziness while walking. He also states that his urine has been yellow/brown in color. He denies any inciting event. The patient denies any sick contacts, IV drug use, recent tattoos regular use of medications, and recent travel. His surgical history is remarkable for a tonsillectomy. He reports no chest pain, SOB, abdominal pain, nausea, emesis, constipation, diarrhea, congestion, rhinorrhea, headache, sinus pressure/pain, ear pain, and sore throat. HPI was provided by the patient. REVIEW OF SYSTEMS     Review of Systems   Constitutional: Positive for fever. Negative for chills, diaphoresis and unexpected weight change. HENT: Negative for congestion, dental problem, drooling, ear discharge, ear pain, facial swelling, nosebleeds, postnasal drip, rhinorrhea, sinus pressure, sinus pain, sneezing, sore throat and voice change. Eyes: Negative for pain, discharge, redness, itching and visual disturbance. Respiratory: Negative for apnea, cough, choking, chest tightness, shortness of breath, wheezing and stridor. Cardiovascular: Negative for chest pain. Gastrointestinal: Negative for abdominal distention, abdominal pain, blood in stool, constipation, diarrhea, nausea and vomiting. Genitourinary: Negative for difficulty urinating, dysuria, frequency, hematuria and urgency. Complains of yellow/brown urine   Musculoskeletal: Negative for gait problem and joint swelling.    Skin: Negative for color change, pallor, rash and wound. Neurological: Positive for dizziness. Negative for tremors, seizures, syncope, facial asymmetry, speech difficulty, weakness, light-headedness and headaches. Psychiatric/Behavioral: Negative for agitation, behavioral problems, confusion, decreased concentration, dysphoric mood, hallucinations, self-injury and suicidal ideas. The patient is not nervous/anxious and is not hyperactive. PAST MEDICAL HISTORY     Past Medical History:   Diagnosis Date    Hepatitis 7/15/2021       SURGICALHISTORY      has a past surgical history that includes Tonsillectomy and sinus surgery. CURRENT MEDICATIONS       Previous Medications    No medications on file       ALLERGIES     has No Known Allergies. FAMILY HISTORY     He indicated that the status of his mother is unknown. He indicated that the status of his paternal grandfather is unknown.   family history includes Cancer in his paternal grandfather; Diabetes in his mother. SOCIAL HISTORY       Social History     Socioeconomic History    Marital status:      Spouse name: Not on file    Number of children: Not on file    Years of education: Not on file    Highest education level: Not on file   Occupational History    Not on file   Tobacco Use    Smoking status: Never Smoker    Smokeless tobacco: Current User   Substance and Sexual Activity    Alcohol use: Not on file    Drug use: Not on file    Sexual activity: Not on file   Other Topics Concern    Not on file   Social History Narrative    Not on file     Social Determinants of Health     Financial Resource Strain: Low Risk     Difficulty of Paying Living Expenses: Not hard at all   Food Insecurity: No Food Insecurity    Worried About [address] in the Last Year: Never true    [address]  in the Last Year: Never true   Transportation Needs:     Lack of Transportation (Medical):      Lack of Transportation (Non-Medical):    Physical Activity:     Days of Exercise per Week:     Minutes of Exercise per Session:    Stress:     Feeling of Stress :    Social Connections:     Frequency of Communication with Friends and Family:     Frequency of Social Gatherings with Friends and Family:     Attends [employer] Services:     Active Member of Clubs or Organizations:     Attends Club or Organization Meetings:     Marital Status:    Intimate Partner Violence:     Fear of Current or Ex-Partner:     Emotionally Abused:     Physically Abused:     Sexually Abused:        PHYSICAL EXAM     INITIAL VITALS:  oral temperature is 100.3 °F (37.9 °C). His blood pressure is 137/100 (abnormal) and his pulse is 89. His respiration is 16 and oxygen saturation is 95%. Physical Exam  Constitutional:       General: He is not in acute distress. Appearance: Normal appearance. He is obese. He is not ill-appearing, toxic-appearing or diaphoretic. HENT:      Head: Normocephalic and atraumatic. Nose: Nose normal. No congestion or rhinorrhea. Mouth/Throat:      Mouth: Mucous membranes are moist.      Pharynx: No oropharyngeal exudate or posterior oropharyngeal erythema. Eyes:      General: Scleral icterus present. Right eye: No discharge. Left eye: No discharge. Extraocular Movements: Extraocular movements intact. Cardiovascular:      Rate and Rhythm: Normal rate. Pulmonary:      Effort: Pulmonary effort is normal. No respiratory distress. Breath sounds: No wheezing. Abdominal:      General: There is no distension. Palpations: Abdomen is soft. There is no mass. Tenderness: There is no abdominal tenderness. There is no guarding or rebound. Hernia: No hernia is present. Musculoskeletal:         General: No swelling or tenderness. Normal range of motion. Cervical back: Normal range of motion and neck supple. No rigidity or tenderness. Skin:     General: Skin is warm and dry.       Coloration: Skin is not jaundiced or pale. Findings: No bruising or erythema. Neurological:      General: No focal deficit present. Mental Status: He is alert and oriented to person, place, and time. Cranial Nerves: No cranial nerve deficit. Psychiatric:         Mood and Affect: Mood normal.         Behavior: Behavior normal.         Thought Content: Thought content normal.         Judgment: Judgment normal.         DIFFERENTIAL DIAGNOSIS:   Hepatitis, biliary obstructive disease, viral syndrome, electrolyte abnormality, acute leukemia, lymphoma. DIAGNOSTIC RESULTS        RADIOLOGY: non-plainfilm images(s) such as CT, Ultrasound and MRI are read by the radiologist.  Plain radiographic images are visualized and preliminarily interpreted by the emergency physician unless otherwise stated below. US GALLBLADDER RUQ   Final Result   There are no acute findings in the visualized upper portions of the abdomen. **This report has been created using voice recognition software. It may contain minor errors which are inherent in voice recognition technology. **      Final report electronically signed by Dr Alfredo Burrows on 7/15/2021 3:40 PM      MRI ABDOMEN W WO CONTRAST MRCP    (Results Pending)         LABS:   Labs Reviewed   CBC WITH AUTO DIFFERENTIAL - Abnormal; Notable for the following components:       Result Value    WBC 1.7 (*)     MCHC 35.9 (*)     Platelets 80 (*)     MPV 13.2 (*)     Segs Absolute 1.0 (*)     Lymphocytes Absolute 0.4 (*)     Monocytes Absolute 0.2 (*)     All other components within normal limits   COMPREHENSIVE METABOLIC PANEL W/ REFLEX TO MG FOR LOW K - Abnormal; Notable for the following components:    Sodium 130 (*)     Chloride 96 (*)     CO2 21 (*)      (*)     Alkaline Phosphatase 210 (*)     Total Bilirubin 6.1 (*)      (*)     All other components within normal limits   LIPASE - Abnormal; Notable for the following components:    Lipase 67.3 (*)     All other the services described in the documentation,reviewed and edited the documentation which was dictated to the scribe in my presence, and it accurately records my words and actions.     Saul Rivera, JANNA 07/15/21 8:29 PM    Thomas Rivera, APRN - CNP          [facility], APRROSALBA - CNP  07/15/21 2031

## 2021-07-16 ENCOUNTER — APPOINTMENT (OUTPATIENT)
Dept: MRI IMAGING | Age: 31
DRG: 442 | End: 2021-07-16
Payer: COMMERCIAL

## 2021-07-16 LAB
ALBUMIN SERPL-MCNC: 3.7 G/DL (ref 3.5–5.1)
ALP BLD-CCNC: 214 U/L (ref 38–126)
ALT SERPL-CCNC: 477 U/L (ref 11–66)
ANION GAP SERPL CALCULATED.3IONS-SCNC: 14 MEQ/L (ref 8–16)
AST SERPL-CCNC: 686 U/L (ref 5–40)
BILIRUB SERPL-MCNC: 5.5 MG/DL (ref 0.3–1.2)
BORDETELLA PARAPERTUSSIS BY PCR: NOT DETECTED
BORDETELLA PERTUSSIS BY PCR: NOT DETECTED
BUN BLDV-MCNC: 9 MG/DL (ref 7–22)
CALCIUM SERPL-MCNC: 8.4 MG/DL (ref 8.5–10.5)
CHLORIDE BLD-SCNC: 99 MEQ/L (ref 98–111)
CO2: 20 MEQ/L (ref 23–33)
CREAT SERPL-MCNC: 0.8 MG/DL (ref 0.4–1.2)
ERYTHROCYTE [DISTWIDTH] IN BLOOD BY AUTOMATED COUNT: 12.2 % (ref 11.5–14.5)
ERYTHROCYTE [DISTWIDTH] IN BLOOD BY AUTOMATED COUNT: 37.2 FL (ref 35–45)
FILM ARRAY ADENOVIRUS: NOT DETECTED
FILM ARRAY CHLAMYDOPHILIA PNEUMONIAE: NOT DETECTED
FILM ARRAY CORONAVIRUS 229E: NOT DETECTED
FILM ARRAY CORONAVIRUS HKU1: NOT DETECTED
FILM ARRAY CORONAVIRUS NL63: NOT DETECTED
FILM ARRAY CORONAVIRUS OC43: NOT DETECTED
FILM ARRAY INFLUENZA A VIRUS: NOT DETECTED
FILM ARRAY INFLUENZA B: NOT DETECTED
FILM ARRAY METAPNEUMOVIRUS: NOT DETECTED
FILM ARRAY MYCOPLASMA PNEUMONIAE: NOT DETECTED
FILM ARRAY PARAINFLUENZA VIRUS 1: NOT DETECTED
FILM ARRAY PARAINFLUENZA VIRUS 2: NOT DETECTED
FILM ARRAY PARAINFLUENZA VIRUS 3: NOT DETECTED
FILM ARRAY PARAINFLUENZA VIRUS 4: NOT DETECTED
FILM ARRAY RESPIRATORY SYNCITIAL VIRUS: NOT DETECTED
FILM ARRAY RHINOVIRUS/ENTEROVIRUS: NOT DETECTED
GFR SERPL CREATININE-BSD FRML MDRD: > 90 ML/MIN/1.73M2
GLUCOSE BLD-MCNC: 99 MG/DL (ref 70–108)
HCT VFR BLD CALC: 42.4 % (ref 42–52)
HEMOGLOBIN: 14.6 GM/DL (ref 14–18)
HETEROPHILE ANTIBODIES: NEGATIVE
INR BLD: 1.16 (ref 0.85–1.13)
MCH RBC QN AUTO: 28.9 PG (ref 26–33)
MCHC RBC AUTO-ENTMCNC: 34.4 GM/DL (ref 32.2–35.5)
MCV RBC AUTO: 84 FL (ref 80–94)
OSMOLALITY CALCULATION: 265.1 MOSMOL/KG (ref 275–300)
PLATELET # BLD: 66 THOU/MM3 (ref 130–400)
PMV BLD AUTO: 12.3 FL (ref 9.4–12.4)
POTASSIUM SERPL-SCNC: 4.2 MEQ/L (ref 3.5–5.2)
RBC # BLD: 5.05 MILL/MM3 (ref 4.7–6.1)
REVIEWED BY: NORMAL
SARS-COV-2, PCR: NOT DETECTED
SCAN OF BLOOD SMEAR: NORMAL
SMEAR REVIEW: NORMAL
SODIUM BLD-SCNC: 133 MEQ/L (ref 135–145)
TOTAL PROTEIN: 5.7 G/DL (ref 6.1–8)
WBC # BLD: 1.6 THOU/MM3 (ref 4.8–10.8)

## 2021-07-16 PROCEDURE — 86308 HETEROPHILE ANTIBODY SCREEN: CPT

## 2021-07-16 PROCEDURE — 36415 COLL VENOUS BLD VENIPUNCTURE: CPT

## 2021-07-16 PROCEDURE — 1200000000 HC SEMI PRIVATE

## 2021-07-16 PROCEDURE — 80053 COMPREHEN METABOLIC PANEL: CPT

## 2021-07-16 PROCEDURE — A9579 GAD-BASE MR CONTRAST NOS,1ML: HCPCS | Performed by: STUDENT IN AN ORGANIZED HEALTH CARE EDUCATION/TRAINING PROGRAM

## 2021-07-16 PROCEDURE — 74183 MRI ABD W/O CNTR FLWD CNTR: CPT

## 2021-07-16 PROCEDURE — 0202U NFCT DS 22 TRGT SARS-COV-2: CPT

## 2021-07-16 PROCEDURE — 6360000004 HC RX CONTRAST MEDICATION: Performed by: STUDENT IN AN ORGANIZED HEALTH CARE EDUCATION/TRAINING PROGRAM

## 2021-07-16 PROCEDURE — 85027 COMPLETE CBC AUTOMATED: CPT

## 2021-07-16 PROCEDURE — 85610 PROTHROMBIN TIME: CPT

## 2021-07-16 PROCEDURE — 2580000003 HC RX 258: Performed by: STUDENT IN AN ORGANIZED HEALTH CARE EDUCATION/TRAINING PROGRAM

## 2021-07-16 RX ORDER — SODIUM CHLORIDE 9 MG/ML
INJECTION, SOLUTION INTRAVENOUS CONTINUOUS
Status: DISCONTINUED | OUTPATIENT
Start: 2021-07-16 | End: 2021-07-19 | Stop reason: HOSPADM

## 2021-07-16 RX ADMIN — SODIUM CHLORIDE: 9 INJECTION, SOLUTION INTRAVENOUS at 23:39

## 2021-07-16 RX ADMIN — SODIUM CHLORIDE: 9 INJECTION, SOLUTION INTRAVENOUS at 10:10

## 2021-07-16 RX ADMIN — GADOTERIDOL 20 ML: 279.3 INJECTION, SOLUTION INTRAVENOUS at 11:22

## 2021-07-16 ASSESSMENT — PAIN SCALES - GENERAL
PAINLEVEL_OUTOF10: 0
PAINLEVEL_OUTOF10: 0

## 2021-07-16 NOTE — CONSULTS
Consult History & Physical      Patient:  Verónica Turcios  YOB: 1990  MRN: [id number]     Acct: [de-identified]    Chief Complaint:    Chief Complaint   Patient presents with    Sinusitis    Generalized Body Aches       Date of Service: Pt seen/examined in consultation on 7/16/2021    History Of Present Illness:      27 y.o. male who we are asked to see/evaluate by Eva Griggs DO for medical management of acute hepatitis. He came to the ED yesterday for fatigue, body aches, fever, and chills. He was in the ED 07/11/21 for the same symptoms and was diagnosed with sinusitis. He was discharged home on Flonase and Ibuprofen. He denies rhinitis and nasal congestion. In the ED, he was noted to febrile, 100.3 F. WBC 2.6. He denies recent ATB use. He was noted to have elevated LFTs, alk phos 210, ALT//350, bilirubin 6.1. On 07/11/21, his LFTs were unremarkable. He denies alcohol and IVDU. He denies Tylenol use. Acetaminophen level WNL. He has some LUQ discomfort with palpation only that started yesterday. Denies nausea, vomiting, diarrhea, constipation, melena, and hematochezia. Denies contact with sick persons. Hepatitis panel negative. US RUQ demonstrated a normal liver, no acute findings. MRCP negative other than moderate to severe splenomegaly. Influenza A and B negative. COVID test negative. He has never had any abdominal surgeries. He has never had an EGD or colonoscopy. Past Medical History:    Past Medical History:   Diagnosis Date    Hepatitis 7/15/2021       Home Medications:  Prior to Admission medications    Not on File       Surgical History:  Past Surgical History:   Procedure Laterality Date    SINUS SURGERY      TONSILLECTOMY         Family History:  Family History   Problem Relation Age of Onset    Diabetes Mother     Cancer Paternal Grandfather        Past GI History:  Negative    Allergies:  Patient has no known allergies.     Social History:   TOBACCO:   reports that he has never smoked. He uses smokeless tobacco.  ETOH:   has no history on file for alcohol use. Review Of Systems  GENERAL: No fever, chills or weight loss. EYES:  No  blurred vision, double vision   CARDIOVASCULAR: No chest pain or palpitations. RESPIRATORY:  No dyspnea or cough. GI:  See HPI  MUSCULOSKELETAL: No new painful or swollen joints or myalgias. :   No dysuria or hematuria. SKIN:  No rashes or jaundice. NEUROLOGIC:  No headaches or seizures, numbness or tingling of arms, or legs. PSYCH:  No anxiety or depression. ENDOCRINE:  No polyuria or polydipsia. BLOOD:  +leukopenia     PHYSICAL EXAM:  /65   Pulse 77   Temp 98.2 °F (36.8 °C) (Oral)   Resp 20   Ht 5' 8\" (1.727 m)   Wt 247 lb 14.4 oz (112.4 kg)   SpO2 95%   BMI 37.69 kg/m²     General appearance: No apparent distress, appears stated age and cooperative. HEENT: Normal cephalic, atraumatic without obvious deformity. Pupils equal, round, and reactive to light. Neck: Supple, with full range of motion. No jugular venous distention. Trachea midline. Respiratory:  Normal respiratory effort. Clear to auscultation, bilaterally without Rales/Wheezes/Rhonchi. Cardiovascular: Regular rate and rhythm without murmurs, rubs or gallops. Abdomen: Soft, tender to left abdomen with palpation, non-distended with active bowel sounds. Splenomegaly noted. Musculoskeletal: No clubbing, cyanosis or edema bilaterally. Skin: Pink, warm, dry. No rashes or lesions.   Psychiatric: Alert and oriented, thought content appropriate, normal insight    Labs:   Recent Labs     07/16/21  0637   WBC 1.6*   HGB 14.6   HCT 42.4   PLT 66*     Recent Labs     07/16/21  0637   *   K 4.2   CL 99   CO2 20*   BUN 9   CREATININE 0.8   CALCIUM 8.4*     Recent Labs     07/15/21  1240 07/15/21  1240 07/16/21  0637   *   < > 686*   *   < > 477*   BILIDIR 5.3*  --   --    BILITOT 6.1*   < > 5.5*   ALKPHOS 210*   < > 214*    < > = values in this interval not displayed. Recent Labs     07/16/21  0637   INR 1.16*       Radiology:   US RUQ 07/15/21  FINDINGS:   Gallbladder - 6.1 x 2.4 x 2.1 cm   Gallbladder Wall - 0.31 cm   Common Duct - 0.36 cm   Sosa's Sign: negative       The liver is of normal echogenicity. No masses are noted.  The pancreatic head and body are within normal limits. The tail is not well seen due to overlying bowel gas.       The gallbladder is not well distended. There is no pericholecystic fluid or gallbladder wall edema.  No gallstones are identified. The common bile duct is normal and measures 4 mm.        There is no hydronephrosis in the visualized aspects of the right kidney.           Impression   There are no acute findings in the visualized upper portions of the abdomen. MRCP 07/16/21  Impression   1. Negative MRCP. 2. There are no focal hepatic defects. There is no abnormal enhancement in the liver. 3. Moderate to severe splenomegaly. These findings would be suspicious for involvement by leukemia/lymphoma. Please correlate clinically. 4. Small retroperitoneal lymph nodes. 5. Small hiatal hernia. 6. Otherwise negative MRI scan of the abdomen with and without intravenous contrast.     Code Status: Full Code    ASSESSMENT:  1. Elevated LFTs, acute hepatitis- suspect viral etiology  2. Splenomegaly- noted on imaging & with palpation  3. Thrombocytopenia  4. Leukopenia  5. Hyponatremia  6. Fatigue, generalized weakness  7.  Fever- resolved    PLAN:     Monitor CBC daily   Monitor HFP daily   Avoid hepatotoxic meds   EBV, CMV, mono screen   Diet as tolerated   Consult hematology given thrombocytopenia, leukopenia, & splenomegaly   Case discussed with primary   Supportive care per primary team  Will follow       Case reviewed and impression/plan reviewed in collaboration with Dr. Tg Willis  Electronically signed by MURIEL De Oliveira - CNP on 7/16/2021 at 1:06 PM    [facility]  Thank you for the consultation. If there are any questions or concerns this weekend, please call Dr. Angeline Geiger as he is covering for [facility].

## 2021-07-16 NOTE — FLOWSHEET NOTE
Pt was anointed     07/16/21 9081   Encounter Summary   Services provided to: Patient and family together   Referral/Consult From: [facility] Visiting Yes  (7/16)   Complexity of Encounter Low   Length of Encounter 15 minutes   Routine   Type Initial   Assessment Approachable;Calm   Intervention [place];Prayer;Nurtured hope   Outcome Acceptance;Expressed gratitude;Encouraged; Hopeful;Receptive   Sacraments   Sacrament of Sick-Anointing Anointed

## 2021-07-16 NOTE — ED NOTES
ED to inpatient nurses report    Chief Complaint   Patient presents with    Sinusitis    Generalized Body Aches      Present to ED from home  LOC: alert and orientated to name, place, date  Vital signs   Vitals:    07/15/21 1425 07/15/21 1507 07/15/21 1644 07/15/21 1817   BP: 121/85 130/72 (!) 146/92 (!) 137/100   Pulse: 84 91 88 89   Resp: 20  16    Temp:       TempSrc:       SpO2: 95%  95%       Oxygen Baseline room air     Current needs required none Bipap/Cpap No  LDAs:   Peripheral IV 07/15/21 Left; Anterior Forearm (Active)     Mobility: Requires assistance * 1  Pending ED orders: None  Present condition: Stable     Electronically signed by Thierno Webber RN on 7/15/2021 at 9:51 PM       Thierno Webber RN  07/15/21 8009

## 2021-07-16 NOTE — CARE COORDINATION
7/16/21, 1:17 PM EDT  DISCHARGE PLANNING EVALUATION:    Usman Steven       Admitted: 7/15/2021/ [address] day: 1   Location: [facility] Reason for admit: Hepatitis [K75.9]   PMH:  has a past medical history of Hepatitis. Procedure: 7/16/2021 MRCP  Barriers to Discharge:  Patient presents with fatigue and generalized body aches. Alk Phos 214, , , Bilirubin 5.5, WBC 1.6, Platelets 66. GI consult, IV fluids, prn medications, regular diet, SCD's, up with assistance. PCP: MURIEL Merritt CNP  Readmission Risk Score: 6%    Patient Goals/Plan/Treatment Preferences: Met with Lucy Corea and his wife present at bedside. Lucy Corea verifies his insurance and PCP. He can afford his medications and denies a need for DME. He will have transportation to home at discharge. Lucy Corea is actively employed. He is independent managing his health care needs. He declines HH. Transportation/Food Security/Housekeeping Addressed:  No issues identified.

## 2021-07-16 NOTE — PLAN OF CARE
Problem: Safety:  Goal: Free from accidental physical injury  Description: Free from accidental physical injury  Outcome: Ongoing   All fall precautions in place. Bed in low position, alarm activated and appropriate use of call light. Problem: Pain:  Goal: Patient's pain/discomfort is manageable  Description: Patient's pain/discomfort is manageable  Outcome: Ongoing   Pain Assessment: 0-10  Pain Level: 0     Pt denies pain thus far during shift, will continue to monitor and reassess. Is pain goal met at this time? Yes     Problem: Skin Integrity:  Goal: Skin integrity will stabilize  Description: Skin integrity will stabilize  Outcome: Ongoing   Skin assessment completed. Patient turned every 2 hours and as needed independently. No skin breakdown this shift. Problem: Discharge Planning:  Goal: Patients continuum of care needs are met  Description: Patients continuum of care needs are met  Outcome: Ongoing   Discharge date remains unclear, plan is to return to home with wife. Problem: Physical Regulation:  Goal: Ability to maintain vital signs within normal range will improve  Description: Ability to maintain vital signs within normal range will improve  3/86/4804 [id number] by Colleen Sheth RN  Outcome: Ongoing   Vital signs remain WNL. Care plan reviewed with patient. Patient verbalizes understanding of the plan of care and contributes to goal setting.

## 2021-07-16 NOTE — PLAN OF CARE
Problem: Physical Regulation:  Goal: Will remain free from infection  Description: Will remain free from infection  Outcome: Ongoing  Goal: Ability to maintain vital signs within normal range will improve  Description: Ability to maintain vital signs within normal range will improve  Outcome: Ongoing

## 2021-07-16 NOTE — PROGRESS NOTES
PROGRESS NOTE      Patient:  Doni Farmer      Unit/Bed:5K-12/012-A    YOB: 1990    MRN: [id number]       Acct: [de-identified]     PCP: MURIEL Ramirez CNP    Date of Admission: 7/15/2021      Assessment/Plan:    Acute hepatitis, worsening  On admission, alk phos 210, , , bilirubin 6.1, direct bilirubin 5.3, , lipase 67.3. Acetaminophen level negative  7/15/21 right upper quadrant ultrasound showed no acute abnormalities  7/16/21 MRCP was significant for moderate to severe splenomegaly, small retroperitoneal lymph nodes, small hiatal hernia, otherwise negative MRCP  GI on board, appreciate recommendations- suspect viral etiology, labs as below   CMP, INR daily  HIV, EBV panel, CMV, mono screen pending  Hold any hepatotoxic drugs     SIRS positive on admission  On admission, patient meeting 2/4 SIRS criteria with elevated respiratory rate as well as leukopenia. Patient nearly met fever threshold with a temperature of 37.9C  On admission, Pro-Herb 0.49, UA not indicative of UTI, WBC 1.7  No definitive source, ? Viral respiratory disease  We will hold off on antibiotics at this time  Will order respiratory viral panel  Continue to monitor for signs or symptoms of infection  CBC in a.m. Leukopenia  On admission today WBC was 1.7, currently 1.6  No signs or symptoms of infection, afebrile  Suspect reactive process to a viral illness  Heme-onc consulted by GI     Thrombocytopenia  On admission platelet count 47,334, currently 66,000  ?   Splenic sequestration  Likely reactive   Blood smear showed leukopenia and thrombocytopenia, no abnormal morphology  Continue to monitor, CBC in a.m., transfuse as needed  Heme-onc consulted by GI     Hyponatremia, improved  On admission sodium 130  Likely secondary to acute hepatitis   Continue to monitor, BMP in a.m.     Elevated lipase  Likely nonsignificant, patient not having significant abdominal pain      Chief Complaint: Body aches, discolored urine    Hospital Course:   27 y.o. male with no significant past medical history who presented to [address] [place] [address] with body aches and discolored urine     Patient states that for the past week he has felt off. Patient endorses feeling fatigued, body aches, fevers, chills, mental fogginess. Patient presented to the ED on 7/11/2021 with similar symptoms including sinus pressure, cough, body aches. Work-up at that time was benign and he was diagnosed with acute sinusitis discharged on Flonase and Motrin. Patient states that since then he has been using Motrin for no effect. Patient states that he is only taken 1 dose of acetaminophen which also did not significantly improve his symptoms. States that this morning he noticed his urine was much darker in color and was prompted by a family member to seek medical attention. Patient denies recent alcohol abuse. Patient denies any history of IV drug use. Patient denies taking any over-the-counter supplements or herbal remedies. Patient works as a semitruck  as well as a farmer. States that last time they sprayed pesticides on the farm was approximately 2 weeks ago. Patient states he does not smoke cigarettes but does have a history of chewing tobacco which he stopped approximately 1 week ago. Patient denies any chest pain, shortness of breath, abdominal pain, diarrhea/constipation, dysuria.     In the ED, patient's initial vitals showed a temperature of 100.3 Fahrenheit, respirations 22, pulse 86, blood pressure 135/87, satting 96% on room air. Patient's labs were significant as follows: Sodium 130, pro-Herb elevated 0.49. LFTs elevated (alk phos 210, , , bilirubin 6.1, direct bilirubin 5.3, ), lipase minimally elevated this 67.3, leukopenia to 1.7, thrombocytopenia to 80. Of note, patient's LFTs on 7/11 were largely within normal limits.   Patient's UA was positive for large amounts of bilirubin, not indicative of any UTI. Right upper quadrant ultrasound was performed that showed no acute findings. Subjective:   Patient seen and evaluated. Patient states that last night he felt better after the 1 L bolus in the ED. States this morning he is feeling a little bit worse. No specific complaints. Denies any chest pain, shortness of breath, nausea, vomiting. Endorses mild left upper quadrant abdominal tenderness. Medications:  Reviewed    Infusion Medications    sodium chloride 100 mL/hr at 07/16/21 1010    sodium chloride       Scheduled Medications    ibuprofen  400 mg Oral Once    sodium chloride flush  10 mL Intravenous 2 times per day     PRN Meds: sodium chloride flush, sodium chloride, ondansetron **OR** ondansetron, polyethylene glycol      Intake/Output Summary (Last 24 hours) at 7/16/2021 1238  Last data filed at 7/16/2021 0945  Gross per 24 hour   Intake 1000 ml   Output 0 ml   Net 1000 ml       Diet:  ADULT DIET; Regular    Exam:  /65   Pulse 77   Temp 98.2 °F (36.8 °C) (Oral)   Resp 20   Ht 5' 8\" (1.727 m)   Wt 247 lb 14.4 oz (112.4 kg)   SpO2 95%   BMI 37.69 kg/m²     General appearance: No apparent distress, appears stated age and cooperative. Laying in bed, accompanied by wife  HEENT: Mild scleral icterus. Conjunctivae/corneas clear. Neck: Supple, with full range of motion. No jugular venous distention. Trachea midline. Respiratory:  Normal respiratory effort. Clear to auscultation, bilaterally without Rales/Wheezes/Rhonchi. Cardiovascular: Regular rate and rhythm with normal S1/S2 without murmurs, rubs or gallops. Abdomen: Soft, non-tender, non-distended with normal bowel sounds. Positive for splenomegaly  Musculoskeletal: passive and active ROM x 4 extremities. Skin: Skin color, texture, turgor normal.  No rashes or lesions. Neurologic:  Neurovascularly intact without any focal sensory/motor deficits.  Cranial nerves: II-XII intact, grossly non-focal.  Psychiatric: Alert and oriented, thought content appropriate, normal insight  Capillary Refill: Brisk,< 3 seconds   Peripheral Pulses: +2 palpable, equal bilaterally       Labs:   Recent Labs     07/15/21  1240 07/16/21  0637   WBC 1.7* 1.6*   HGB 15.5 14.6   HCT 43.2 42.4   PLT 80* 66*     Recent Labs     07/15/21  1240 07/16/21  0637   * 133*   K 4.2 4.2   CL 96* 99   CO2 21* 20*   BUN 11 9   CREATININE 0.7 0.8   CALCIUM 8.5 8.4*     Recent Labs     07/15/21  1240 07/16/21  0637   * 686*   * 477*   BILIDIR 5.3*  --    BILITOT 6.1* 5.5*   ALKPHOS 210* 214*     Recent Labs     07/16/21  0637   INR 1.16*     No results for input(s): CKTOTAL, TROPONINI in the last 72 hours. Urinalysis:      Lab Results   Component Value Date    NITRU NEGATIVE 07/15/2021    WBCUA NONE 07/15/2021    BACTERIA NONE 07/15/2021    RBCUA NONE 07/15/2021    BLOODU TRACE 07/15/2021    SPECGRAV 1.020 06/25/2014    GLUCOSEU NEGATIVE 07/15/2021       Radiology:  MRI ABDOMEN W WO CONTRAST MRCP   Final Result   1. Negative MRCP. 2. There are no focal hepatic defects. There is no abnormal enhancement in the liver. 3. Moderate to severe splenomegaly. These findings would be suspicious for involvement by leukemia/lymphoma. Please correlate clinically. 4. Small retroperitoneal lymph nodes. 5. Small hiatal hernia. 6. Otherwise negative MRI scan of the abdomen with and without intravenous contrast.      Final report electronically signed by DR Jamari Jimenes on 7/16/2021 11:44 AM      US GALLBLADDER RUQ   Final Result   There are no acute findings in the visualized upper portions of the abdomen. **This report has been created using voice recognition software. It may contain minor errors which are inherent in voice recognition technology. **      Final report electronically signed by Dr Kamla Choe on 7/15/2021 3:40 PM          Diet: ADULT DIET;  Regular    DVT prophylaxis: [] Lovenox                                 [x] SCDs [] SQ Heparin                                 [x] Encourage ambulation           [] Already on Anticoagulation     Disposition:    [x] Home       [] TCU       [] Rehab       [] Psych       [] SNF       [] [place]       [] Other-    Code Status: Full Code    PT/OT Eval Status:       Electronically signed by Octavio Delgado MD on 7/16/2021 at 12:38 PM

## 2021-07-17 LAB
ALBUMIN SERPL-MCNC: 3.2 G/DL (ref 3.5–5.1)
ALBUMIN SERPL-MCNC: 3.6 G/DL (ref 3.5–5.1)
ALP BLD-CCNC: 224 U/L (ref 38–126)
ALP BLD-CCNC: 251 U/L (ref 38–126)
ALT SERPL-CCNC: 598 U/L (ref 11–66)
ALT SERPL-CCNC: 643 U/L (ref 11–66)
ANION GAP SERPL CALCULATED.3IONS-SCNC: 10 MEQ/L (ref 8–16)
ANION GAP SERPL CALCULATED.3IONS-SCNC: 11 MEQ/L (ref 8–16)
AST SERPL-CCNC: 600 U/L (ref 5–40)
AST SERPL-CCNC: 635 U/L (ref 5–40)
BILIRUB SERPL-MCNC: 4.3 MG/DL (ref 0.3–1.2)
BILIRUB SERPL-MCNC: 4.4 MG/DL (ref 0.3–1.2)
BUN BLDV-MCNC: 8 MG/DL (ref 7–22)
BUN BLDV-MCNC: 9 MG/DL (ref 7–22)
CALCIUM SERPL-MCNC: 7.8 MG/DL (ref 8.5–10.5)
CALCIUM SERPL-MCNC: 8.3 MG/DL (ref 8.5–10.5)
CHLORIDE BLD-SCNC: 99 MEQ/L (ref 98–111)
CHLORIDE BLD-SCNC: 99 MEQ/L (ref 98–111)
CO2: 21 MEQ/L (ref 23–33)
CO2: 24 MEQ/L (ref 23–33)
CREAT SERPL-MCNC: 0.8 MG/DL (ref 0.4–1.2)
CREAT SERPL-MCNC: 0.8 MG/DL (ref 0.4–1.2)
ERYTHROCYTE [DISTWIDTH] IN BLOOD BY AUTOMATED COUNT: 12.3 % (ref 11.5–14.5)
ERYTHROCYTE [DISTWIDTH] IN BLOOD BY AUTOMATED COUNT: 38 FL (ref 35–45)
GFR SERPL CREATININE-BSD FRML MDRD: > 90 ML/MIN/1.73M2
GFR SERPL CREATININE-BSD FRML MDRD: > 90 ML/MIN/1.73M2
GLUCOSE BLD-MCNC: 100 MG/DL (ref 70–108)
GLUCOSE BLD-MCNC: 101 MG/DL (ref 70–108)
HBV SURFACE AB TITR SER: POSITIVE {TITER}
HCT VFR BLD CALC: 40.5 % (ref 42–52)
HEMOGLOBIN: 14.2 GM/DL (ref 14–18)
HIV AG/AB: NONREACTIVE
INR BLD: 1.16 (ref 0.85–1.13)
LD: 889 U/L (ref 100–190)
MCH RBC QN AUTO: 29.7 PG (ref 26–33)
MCHC RBC AUTO-ENTMCNC: 35.1 GM/DL (ref 32.2–35.5)
MCV RBC AUTO: 84.7 FL (ref 80–94)
PLATELET # BLD: 63 THOU/MM3 (ref 130–400)
PMV BLD AUTO: 12.6 FL (ref 9.4–12.4)
POTASSIUM SERPL-SCNC: 3.8 MEQ/L (ref 3.5–5.2)
POTASSIUM SERPL-SCNC: 3.9 MEQ/L (ref 3.5–5.2)
RBC # BLD: 4.78 MILL/MM3 (ref 4.7–6.1)
SODIUM BLD-SCNC: 131 MEQ/L (ref 135–145)
SODIUM BLD-SCNC: 133 MEQ/L (ref 135–145)
TOTAL PROTEIN: 5.7 G/DL (ref 6.1–8)
TOTAL PROTEIN: 6 G/DL (ref 6.1–8)
URIC ACID: 3.3 MG/DL (ref 3.7–7)
WBC # BLD: 2 THOU/MM3 (ref 4.8–10.8)

## 2021-07-17 PROCEDURE — 80053 COMPREHEN METABOLIC PANEL: CPT

## 2021-07-17 PROCEDURE — 36415 COLL VENOUS BLD VENIPUNCTURE: CPT

## 2021-07-17 PROCEDURE — 84550 ASSAY OF BLOOD/URIC ACID: CPT

## 2021-07-17 PROCEDURE — 83615 LACTATE (LD) (LDH) ENZYME: CPT

## 2021-07-17 PROCEDURE — 6370000000 HC RX 637 (ALT 250 FOR IP): Performed by: INTERNAL MEDICINE

## 2021-07-17 PROCEDURE — 85027 COMPLETE CBC AUTOMATED: CPT

## 2021-07-17 PROCEDURE — 86644 CMV ANTIBODY: CPT

## 2021-07-17 PROCEDURE — 86706 HEP B SURFACE ANTIBODY: CPT

## 2021-07-17 PROCEDURE — 1200000000 HC SEMI PRIVATE

## 2021-07-17 PROCEDURE — 2580000003 HC RX 258: Performed by: STUDENT IN AN ORGANIZED HEALTH CARE EDUCATION/TRAINING PROGRAM

## 2021-07-17 PROCEDURE — 86645 CMV ANTIBODY IGM: CPT

## 2021-07-17 PROCEDURE — 85610 PROTHROMBIN TIME: CPT

## 2021-07-17 RX ORDER — PANTOPRAZOLE SODIUM 40 MG/1
40 TABLET, DELAYED RELEASE ORAL
Status: DISCONTINUED | OUTPATIENT
Start: 2021-07-17 | End: 2021-07-19 | Stop reason: HOSPADM

## 2021-07-17 RX ORDER — CALCIUM CARBONATE 200(500)MG
500 TABLET,CHEWABLE ORAL 3 TIMES DAILY PRN
Status: DISCONTINUED | OUTPATIENT
Start: 2021-07-17 | End: 2021-07-19 | Stop reason: HOSPADM

## 2021-07-17 RX ADMIN — PANTOPRAZOLE SODIUM 40 MG: 40 TABLET, DELAYED RELEASE ORAL at 13:14

## 2021-07-17 RX ADMIN — SODIUM CHLORIDE: 9 INJECTION, SOLUTION INTRAVENOUS at 08:27

## 2021-07-17 ASSESSMENT — PAIN SCALES - GENERAL: PAINLEVEL_OUTOF10: 0

## 2021-07-17 NOTE — PROGRESS NOTES
Pt Name: Bhavin Rosales  MRN: [id number]  [id number]  YOB: 1990  Admit Date: 7/15/2021 11:48 AM  Date of evaluation: 7/17/2021  Primary Care Physician: MURIEL Randolph - CNP   5K-12/012-A     PAULIE FATIMA. Complains of fatigue, weakness, GERD, anorexia    O.     Vitals:    07/17/21 0815   BP: 117/69   Pulse: 82   Resp: 16   Temp: 98.8 °F (37.1 °C)   SpO2: 93%       Body mass index is 37.69 kg/m². Awake and alert   clear to auscultation bilaterally   regular rate and rhythm   Soft and nondistended with normal BS, nontender   no cyanosis, clubbing or edema present      Current Meds    ibuprofen  400 mg Oral Once    sodium chloride flush  10 mL Intravenous 2 times per day      sodium chloride 100 mL/hr at 07/17/21 0827    sodium chloride       Diet: ADULT DIET; Regular    A.  27 y.o. male was admitted 7/11/21 for fatigue, body aches, fever and chills, jaundice and splenomegaly.     Acute hepatitis, likely viral mono - monoscreen negative  Hyperbilirubinemia, direct  Elevated LFT's/transaminases  Splenomegaly  Thrombocytopenia  Leukopenia  Hyponatremia  hypoalbuminemia  Fatigue, generalized weakness  Fever- resolved    P.      · Monitor CBC daily  · Monitor HFP daily  · Avoid hepatotoxic meds  · Diet as tolerated  · Supportive care      Await CMV  EBV Ab's as I still suspect mono  HBsAb  Autoimmune labs    Isolation not required    Recheck HCV Ab in 6m    Sanam Castellanos MD, MD  11:42 AM 7/17/2021

## 2021-07-17 NOTE — PLAN OF CARE
Problem: Physical Regulation:  Goal: Will remain free from infection  7/17/2021 0131 by Martina Jernigan RN  Outcome: Ongoing   Patient temperature has fluctuated  100.3 and 98.4 oral , 101.8 axillary. I applied ice therapy due to patient refusal of one time dose of ibuprofen. Patient claims ibuprofen upsets his stomach.  Will continue to monitor vitals

## 2021-07-17 NOTE — PLAN OF CARE
Problem: Safety:  Goal: Free from accidental physical injury  Description: Free from accidental physical injury  Outcome: Ongoing   All fall precautions in place. Bed in low position, alarm activated and appropriate use of call light. Problem: Skin Integrity:  Goal: Skin integrity will stabilize  Description: Skin integrity will stabilize  Outcome: Ongoing   Skin assessment completed. Patient turned every 2 hours and as needed independently. No skin breakdown this shift. Problem: Discharge Planning:  Goal: Patients continuum of care needs are met  Description: Patients continuum of care needs are met  Outcome: Ongoing   Discharge date remains unclear, plan is to return to home with wife. Problem: Pain:  Goal: Patient's pain/discomfort is manageable  Description: Patient's pain/discomfort is manageable  Outcome: Ongoing   Pain Assessment: 0-10  Pain Level: 0     Pt denies pain during shift, will continue to monitor and reasses. Is pain goal met at this time? Yes     Care plan reviewed with patient. Patient verbalizes understanding of the plan of care and contributes to goal setting.

## 2021-07-17 NOTE — CONSULTS
[facility]    Patient - Sherice Huntley   MRN -  [id number]   Acct # - [de-identified]   - 1990      Date of Admission -  7/15/2021 11:48 AM  Date of evaluation -  2021  Room - [facility]12/012-   Hospital Day - 2  Consulting - Lysle Lanes, DO Primary Care Physician - Darrion OrMURIEL - CNP       Reason for Consult      Neutropenia  Thrombocytopenia  Splenomegaly  Hepatitis  Fevers  ActiveHospital Problem List      Active Hospital Problems    Diagnosis Date Noted    Hepatitis [K75.9] 07/15/2021     HPI   Sherice Huntley is a 27 y.o. male admitted for work-up of his hepatitis splenomegaly and pancytopenia. His symptoms started approximately 10 days ago. Patient presented to the emergency room on 2021 with fatigue body aches fevers mental fogginess. At that time lab work  Showed a mild clear neutropenia white blood cell count 2.6 normal hemoglobin and normal platelet count of 802,558. His liver function was unremarkable normal bilirubin just slightly elevated AST. He was diagnosed with acute sinusitis discharged home on Flonase and Motrin. He continues to feel very tired had chills shivering fevers. He came to the emergency room after his urine was very dark. Patient denies any recent travel, no contact with somebody who was febrile and sick. He denies any history of IV drug use, no alcohol abuse. 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   Meds    Current Medications    pantoprazole  40 mg Oral QAM AC    ibuprofen  400 mg Oral Once    sodium chloride flush  10 mL Intravenous 2 times per day     calcium carbonate, sodium chloride flush, sodium chloride, ondansetron **OR** ondansetron, polyethylene glycol  IV Drips/Infusions      sodium chloride 50 mL/hr at 21 1023    sodium chloride       Past Medical History         Diagnosis Date    Hepatitis 7/15/2021      Past Surgical History           Procedure Laterality Date    SINUS SURGERY      TONSILLECTOMY       Diet    ADULT DIET; Regular  Allergies   Patient has no known allergies. Social History     Social History     Socioeconomic History    Marital status:      Spouse name: Not on file    Number of children: Not on file    Years of education: Not on file    Highest education level: Not on file   Occupational History    Not on file   Tobacco Use    Smoking status: Never Smoker    Smokeless tobacco: Current User   Substance and Sexual Activity    Alcohol use: Not on file    Drug use: Not on file    Sexual activity: Not on file   Other Topics Concern    Not on file   Social History Narrative    Not on file     Social Determinants of Health     Financial Resource Strain: Low Risk     Difficulty of Paying Living Expenses: Not hard at all   Food Insecurity: No Food Insecurity    Worried About [address] [employer] in the Last Year: Never true    [address] [employer] in the Last Year: Never true   Transportation Needs:     Lack of Transportation (Medical):  Lack of Transportation (Non-Medical):    Physical Activity:     Days of Exercise per Week:     Minutes of Exercise per Session:    Stress:     Feeling of Stress :    Social Connections:     Frequency of Communication with Friends and Family:     Frequency of Social Gatherings with Friends and Family:     Attends [employer] Services:     Active Member of Clubs or Organizations:     Attends Club or Organization Meetings:     Marital Status:    Intimate Partner Violence:     Fear of Current or Ex-Partner:     Emotionally Abused:     Physically Abused:     Sexually Abused:      Family History          Problem Relation Age of Onset    Diabetes Mother     Cancer Paternal Grandfather      ROS     Review of Systems   Constitutional: Positive for activity change, fatigue and fever. Gastrointestinal: Positive for abdominal pain.         Vitals     height is 5' 8\" (1.727 m) and weight is 247 lb 14.4 oz (112.4 kg). His oral temperature is 98.8 °F (37.1 °C). His blood pressure is 117/69 and his pulse is 82. His respiration is 16 and oxygen saturation is 93%. Exam   Physical Exam  Vitals reviewed. Constitutional:       General: He is not in acute distress. Appearance: He is well-developed. HENT:      Head: Normocephalic. Mouth/Throat:      Pharynx: No oropharyngeal exudate. Eyes:      General: No scleral icterus. Right eye: No discharge. Left eye: No discharge. Pupils: Pupils are equal, round, and reactive to light. Neck:      Thyroid: No thyromegaly. Vascular: No JVD. Trachea: No tracheal deviation. Cardiovascular:      Rate and Rhythm: Normal rate. Heart sounds: Normal heart sounds. No murmur heard. No friction rub. No gallop. Pulmonary:      Effort: Pulmonary effort is normal. No respiratory distress. Breath sounds: Normal breath sounds. No stridor. No wheezing or rales. Chest:      Chest wall: No tenderness. Abdominal:      General: Bowel sounds are normal. There is no distension. Palpations: Abdomen is soft. There is splenomegaly. There is no mass. Tenderness: There is abdominal tenderness in the left upper quadrant. There is no rebound. Musculoskeletal:         General: Normal range of motion. Cervical back: Normal range of motion and neck supple. Comments: Good range of motion in all four extremities. Lymphadenopathy:      Cervical: No cervical adenopathy. Skin:     General: Skin is warm. Findings: No erythema or rash. Neurological:      Mental Status: He is alert and oriented to person, place, and time. Cranial Nerves: No cranial nerve deficit. Motor: No abnormal muscle tone. Deep Tendon Reflexes: Reflexes are normal and symmetric. Psychiatric:         Behavior: Behavior normal.         Thought Content:  Thought content normal.         Judgment: Judgment normal.             Labs   CBC  Recent Labs     07/15/21  1240 07/16/21  0637 07/17/21  0555   WBC 1.7* 1.6* 2.0*   RBC 5.21 5.05 4.78   HGB 15.5 14.6 14.2   HCT 43.2 42.4 40.5*   MCV 82.9 84.0 84.7   MCH 29.8 28.9 29.7   MCHC 35.9* 34.4 35.1   PLT 80* 66* 63*   MPV 13.2* 12.3 12.6*      BMP  Recent Labs     07/15/21  1240 07/16/21  0637 07/17/21  0555   * 133* 131*   K 4.2 4.2 3.9   CL 96* 99 99   CO2 21* 20* 21*   BUN 11 9 8   CREATININE 0.7 0.8 0.8   GLUCOSE 99 99 100   CALCIUM 8.5 8.4* 7.8*     LFT  Recent Labs     07/15/21  1240 07/16/21  0637 07/17/21  0555   * 686* 635*   * 477* 598*   BILITOT 6.1* 5.5* 4.3*   ALKPHOS 210* 214* 224*   LIPASE 67.3*  --   --      INR  Recent Labs     07/16/21 0637 07/17/21  0555   INR 1.16* 1.16*     PTT  No results for input(s): APTT in the last 72 hours. Radiology        US GALLBLADDER RUQ    Result Date: 7/15/2021  PROCEDURE: US GALLBLADDER RUQ CLINICAL INFORMATION: 79-year-old male with jaundice and fever. COMPARISON: No prior study. TECHNIQUE: Multiplanar sonographic images were obtained of the structures in the right upper quadrant. FINDINGS: Gallbladder - 6.1 x 2.4 x 2.1 cm Gallbladder Wall - 0.31 cm Common Duct - 0.36 cm Sosa's Sign: negative The liver is of normal echogenicity. No masses are noted. The pancreatic head and body are within normal limits. The tail is not well seen due to overlying bowel gas. The gallbladder is not well distended. There is no pericholecystic fluid or gallbladder wall edema. No gallstones are identified. The common bile duct is normal and measures 4 mm. There is no hydronephrosis in the visualized aspects of the right kidney. There are no acute findings in the visualized upper portions of the abdomen. **This report has been created using voice recognition software. It may contain minor errors which are inherent in voice recognition technology. ** Final report electronically signed by Dr Elysia Fry on 7/15/2021 3:40 PM    XR CHEST scan of the abdomen with and without intravenous contrast. Final report electronically signed by DR Goyo Junior on 7/16/2021 11:44 AM      Assessment/Recommendations     1. Neutropenia and thrombocytopenia with acute hepatitis and splenomegaly. Unclear etiology of acute hepatitis. Possibly mono. Virology studies are pending. The liver disease  impairs platelet production, from decreased hepatic synthesis of thrombopoietin; increased platelet sequestration in the spleen. Neutropenia can occur during or following bacterial, parasitic, rickettsial, and viral infections, including hepatitis and HIV infection. However,  hematologic malignancies can cause pancytopenia and may present with infection. [facility] HIV, EBV panel, CMV, HBsAb, autoimmune labs pending. Monitor CBC daily, blood for flow cytometry on lymphoma/leukemia panel. Low threshold for performing bone marrow biopsy. \"Thank you for asking us to see this interestingpatient\"    Case discussed with nurse and patient/family. Questions and concerns addressed.     Electronically signed by     Ashlyn Lew MD on 7/17/2021 at 1:38 PM

## 2021-07-17 NOTE — PROGRESS NOTES
PROGRESS NOTE      Patient:  Juanita Brody      Unit/Bed:5K-12/012-A    YOB: 1990    MRN: [id number]       Acct: [de-identified]     PCP: MURIEL Kingsley CNP    Date of Admission: 7/15/2021      Assessment/Plan:    Acute hepatitis, worsening  On admission, alk phos 210, , , bilirubin 6.1, direct bilirubin 5.3, , lipase 67.3. Acetaminophen level negative  7/15/21 right upper quadrant ultrasound showed no acute abnormalities  7/16/21 MRCP was significant for moderate to severe splenomegaly, small retroperitoneal lymph nodes, small hiatal hernia, otherwise negative MRCP  GI on board, appreciate recommendations- suspect viral etiology (mono) despite negative mono screen   CMP, INR daily  HIV, EBV panel, CMV, HBsAb, autoimmune labs pending  Hold any hepatotoxic drugs     SIRS positive on admission  On admission, patient meeting 2/4 SIRS criteria with elevated respiratory rate as well as leukopenia. Patient nearly met fever threshold with a temperature of 37.9C  On admission, Pro-Herb 0.49, UA not indicative of UTI, WBC 1.7  No definitive source, ? Viral respiratory disease  We will hold off on antibiotics at this time  Respiratory viral panel negative  Continue to monitor for signs or symptoms of infection  CBC in a.m. Leukopenia  On admission today WBC was 1.7, currently 2.0  No signs or symptoms of infection  Suspect reactive process to a viral illness  Continue to monitor, CBC in a.m. Heme-onc consulted     Thrombocytopenia  On admission platelet count 35,458, currently 63,000  ?   Splenic sequestration  Likely reactive   Blood smear showed leukopenia and thrombocytopenia, no abnormal morphology  Continue to monitor, CBC in a.m., transfuse as needed  Heme-onc consulted     Hyponatremia, stable  On admission sodium 130  Likely secondary to acute hepatitis   Continue to monitor, BMP in a.m.     Elevated lipase  Likely nonsignificant, patient not having significant abdominal pain    Reflux  Protonix 40 mg daily      Chief Complaint: Body aches, discolored urine    Hospital Course:   27 y.o. male with no significant past medical history who presented to [address] [place] [address] with body aches and discolored urine     Patient states that for the past week he has felt off. Patient endorses feeling fatigued, body aches, fevers, chills, mental fogginess. Patient presented to the ED on 7/11/2021 with similar symptoms including sinus pressure, cough, body aches. Work-up at that time was benign and he was diagnosed with acute sinusitis discharged on Flonase and Motrin. Patient states that since then he has been using Motrin for no effect. Patient states that he is only taken 1 dose of acetaminophen which also did not significantly improve his symptoms. States that this morning he noticed his urine was much darker in color and was prompted by a family member to seek medical attention. Patient denies recent alcohol abuse. Patient denies any history of IV drug use. Patient denies taking any over-the-counter supplements or herbal remedies. Patient works as a semitruck  as well as a farmer. States that last time they sprayed pesticides on the farm was approximately 2 weeks ago. Patient states he does not smoke cigarettes but does have a history of chewing tobacco which he stopped approximately 1 week ago. Patient denies any chest pain, shortness of breath, abdominal pain, diarrhea/constipation, dysuria.     In the ED, patient's initial vitals showed a temperature of 100.3 Fahrenheit, respirations 22, pulse 86, blood pressure 135/87, satting 96% on room air. Patient's labs were significant as follows: Sodium 130, pro-Herb elevated 0.49. LFTs elevated (alk phos 210, , , bilirubin 6.1, direct bilirubin 5.3, ), lipase minimally elevated this 67.3, leukopenia to 1.7, thrombocytopenia to 80.   Of note, patient's LFTs on 7/11 were largely within normal limits. Patient's UA was positive for large amounts of bilirubin, not indicative of any UTI. Right upper quadrant ultrasound was performed that showed no acute findings. Subjective:   Patient seen and evaluated. Patient states he feels overall better this morning. Did have a fever last night that is since resolved. Was able to eat some breakfast.  Still endorses taste changes. Patient continues to endorse feeling off, difficult to describe his symptoms. Concerns of orthostatic hypotensive episodes overnight, however vitals have been stable. Patient denies any chest pain, shortness of breath, abdominal pain, dysuria. Endorses some reflux type symptoms in the past, especially while chewing tobacco.      Medications:  Reviewed    Infusion Medications    sodium chloride 50 mL/hr at 07/17/21 1023    sodium chloride       Scheduled Medications    pantoprazole  40 mg Oral QAM AC    ibuprofen  400 mg Oral Once    sodium chloride flush  10 mL Intravenous 2 times per day     PRN Meds: calcium carbonate, sodium chloride flush, sodium chloride, ondansetron **OR** ondansetron, polyethylene glycol      Intake/Output Summary (Last 24 hours) at 7/17/2021 1346  Last data filed at 7/17/2021 1310  Gross per 24 hour   Intake 2416.34 ml   Output 0 ml   Net 2416.34 ml       Diet:  ADULT DIET; Regular    Exam:  /69   Pulse 82   Temp 98.8 °F (37.1 °C) (Oral)   Resp 16   Ht 5' 8\" (1.727 m)   Wt 247 lb 14.4 oz (112.4 kg)   SpO2 93%   BMI 37.69 kg/m²     General appearance: No apparent distress, appears stated age and cooperative. Laying in bed, accompanied by wife  HEENT: Conjunctivae/corneas clear. Neck: Supple, with full range of motion. No jugular venous distention. Trachea midline. Respiratory:  Normal respiratory effort. Clear to auscultation, bilaterally without Rales/Wheezes/Rhonchi. Cardiovascular: Regular rate and rhythm with normal S1/S2 without murmurs, rubs or gallops.   Abdomen: Soft, non-tender, non-distended with normal bowel sounds. Positive for splenomegaly  Musculoskeletal: passive and active ROM x 4 extremities. Skin: Skin color, texture, turgor normal.  No rashes or lesions. Neurologic:  Neurovascularly intact without any focal sensory/motor deficits. Cranial nerves: II-XII intact, grossly non-focal.  Psychiatric: Alert and oriented, thought content appropriate, normal insight  Capillary Refill: Brisk,< 3 seconds   Peripheral Pulses: +2 palpable, equal bilaterally       Labs:   Recent Labs     07/15/21  1240 07/16/21  0637 07/17/21  0555   WBC 1.7* 1.6* 2.0*   HGB 15.5 14.6 14.2   HCT 43.2 42.4 40.5*   PLT 80* 66* 63*     Recent Labs     07/15/21  1240 07/16/21  0637 07/17/21  0555   * 133* 131*   K 4.2 4.2 3.9   CL 96* 99 99   CO2 21* 20* 21*   BUN 11 9 8   CREATININE 0.7 0.8 0.8   CALCIUM 8.5 8.4* 7.8*     Recent Labs     07/15/21  1240 07/16/21  0637 07/17/21  0555   * 686* 635*   * 477* 598*   BILIDIR 5.3*  --   --    BILITOT 6.1* 5.5* 4.3*   ALKPHOS 210* 214* 224*     Recent Labs     07/16/21  0637 07/17/21  0555   INR 1.16* 1.16*     No results for input(s): CKTOTAL, TROPONINI in the last 72 hours. Urinalysis:      Lab Results   Component Value Date    NITRU NEGATIVE 07/15/2021    WBCUA NONE 07/15/2021    BACTERIA NONE 07/15/2021    RBCUA NONE 07/15/2021    BLOODU TRACE 07/15/2021    SPECGRAV 1.020 06/25/2014    GLUCOSEU NEGATIVE 07/15/2021       Radiology:  MRI ABDOMEN W WO CONTRAST MRCP   Final Result   1. Negative MRCP. 2. There are no focal hepatic defects. There is no abnormal enhancement in the liver. 3. Moderate to severe splenomegaly. These findings would be suspicious for involvement by leukemia/lymphoma. Please correlate clinically. 4. Small retroperitoneal lymph nodes. 5. Small hiatal hernia.    6. Otherwise negative MRI scan of the abdomen with and without intravenous contrast.      Final report electronically signed by DR Santosh Al on 7/16/2021 11:44 AM      US GALLBLADDER RUQ   Final Result   There are no acute findings in the visualized upper portions of the abdomen. **This report has been created using voice recognition software. It may contain minor errors which are inherent in voice recognition technology. **      Final report electronically signed by Dr Jeremy Hoffmann on 7/15/2021 3:40 PM          Diet: ADULT DIET;  Regular    DVT prophylaxis: [] Lovenox                                 [x] SCDs                                 [] SQ Heparin                                 [x] Encourage ambulation           [] Already on Anticoagulation     Disposition:    [x] Home       [] TCU       [] Rehab       [] Psych       [] SNF       [] [facility]       [] Other-    Code Status: Full Code    PT/OT Eval Status:       Electronically signed by Dyan Isaacs MD on 7/17/2021 at 1:46 PM

## 2021-07-18 PROBLEM — D69.6 THROMBOCYTOPENIA (HCC): Status: ACTIVE | Noted: 2021-07-18

## 2021-07-18 PROBLEM — D72.819 LEUKOPENIA: Status: ACTIVE | Noted: 2021-07-18

## 2021-07-18 PROBLEM — R79.89 ELEVATED LFTS: Status: ACTIVE | Noted: 2021-07-15

## 2021-07-18 LAB
ALBUMIN SERPL-MCNC: 3.5 G/DL (ref 3.5–5.1)
ALP BLD-CCNC: 247 U/L (ref 38–126)
ALT SERPL-CCNC: 572 U/L (ref 11–66)
ANION GAP SERPL CALCULATED.3IONS-SCNC: 11 MEQ/L (ref 8–16)
AST SERPL-CCNC: 460 U/L (ref 5–40)
BILIRUB SERPL-MCNC: 2.9 MG/DL (ref 0.3–1.2)
BILIRUBIN DIRECT: 2.1 MG/DL (ref 0–0.3)
BUN BLDV-MCNC: 8 MG/DL (ref 7–22)
CALCIUM SERPL-MCNC: 8.2 MG/DL (ref 8.5–10.5)
CHLORIDE BLD-SCNC: 99 MEQ/L (ref 98–111)
CO2: 22 MEQ/L (ref 23–33)
CREAT SERPL-MCNC: 0.7 MG/DL (ref 0.4–1.2)
EPSTEIN-BARR VIRUS ANTIBODIES: NORMAL
ERYTHROCYTE [DISTWIDTH] IN BLOOD BY AUTOMATED COUNT: 12.5 % (ref 11.5–14.5)
ERYTHROCYTE [DISTWIDTH] IN BLOOD BY AUTOMATED COUNT: 38.8 FL (ref 35–45)
GFR SERPL CREATININE-BSD FRML MDRD: > 90 ML/MIN/1.73M2
GLUCOSE BLD-MCNC: 119 MG/DL (ref 70–108)
HCT VFR BLD CALC: 40.6 % (ref 42–52)
HEMOGLOBIN: 14.1 GM/DL (ref 14–18)
HEPATITIS B CORE TOTAL ANTIBODY: NONREACTIVE
IGG: 773 MG/DL (ref 700–1600)
MCH RBC QN AUTO: 29.6 PG (ref 26–33)
MCHC RBC AUTO-ENTMCNC: 34.7 GM/DL (ref 32.2–35.5)
MCV RBC AUTO: 85.1 FL (ref 80–94)
PLATELET # BLD: 78 THOU/MM3 (ref 130–400)
PMV BLD AUTO: 12.8 FL (ref 9.4–12.4)
POTASSIUM SERPL-SCNC: 3.8 MEQ/L (ref 3.5–5.2)
RBC # BLD: 4.77 MILL/MM3 (ref 4.7–6.1)
SODIUM BLD-SCNC: 132 MEQ/L (ref 135–145)
TOTAL PROTEIN: 5.7 G/DL (ref 6.1–8)
WBC # BLD: 2.1 THOU/MM3 (ref 4.8–10.8)

## 2021-07-18 PROCEDURE — 86038 ANTINUCLEAR ANTIBODIES: CPT

## 2021-07-18 PROCEDURE — 86704 HEP B CORE ANTIBODY TOTAL: CPT

## 2021-07-18 PROCEDURE — 36415 COLL VENOUS BLD VENIPUNCTURE: CPT

## 2021-07-18 PROCEDURE — 80053 COMPREHEN METABOLIC PANEL: CPT

## 2021-07-18 PROCEDURE — 6370000000 HC RX 637 (ALT 250 FOR IP): Performed by: INTERNAL MEDICINE

## 2021-07-18 PROCEDURE — 6360000002 HC RX W HCPCS: Performed by: FAMILY MEDICINE

## 2021-07-18 PROCEDURE — 99232 SBSQ HOSP IP/OBS MODERATE 35: CPT | Performed by: FAMILY MEDICINE

## 2021-07-18 PROCEDURE — 2580000003 HC RX 258: Performed by: STUDENT IN AN ORGANIZED HEALTH CARE EDUCATION/TRAINING PROGRAM

## 2021-07-18 PROCEDURE — 86663 EPSTEIN-BARR ANTIBODY: CPT

## 2021-07-18 PROCEDURE — 82248 BILIRUBIN DIRECT: CPT

## 2021-07-18 PROCEDURE — 88185 FLOWCYTOMETRY/TC ADD-ON: CPT

## 2021-07-18 PROCEDURE — 83516 IMMUNOASSAY NONANTIBODY: CPT

## 2021-07-18 PROCEDURE — 86255 FLUORESCENT ANTIBODY SCREEN: CPT

## 2021-07-18 PROCEDURE — 1200000000 HC SEMI PRIVATE

## 2021-07-18 PROCEDURE — 86665 EPSTEIN-BARR CAPSID VCA: CPT

## 2021-07-18 PROCEDURE — 88184 FLOWCYTOMETRY/ TC 1 MARKER: CPT

## 2021-07-18 PROCEDURE — 85027 COMPLETE CBC AUTOMATED: CPT

## 2021-07-18 PROCEDURE — 86664 EPSTEIN-BARR NUCLEAR ANTIGEN: CPT

## 2021-07-18 PROCEDURE — 82784 ASSAY IGA/IGD/IGG/IGM EACH: CPT

## 2021-07-18 RX ORDER — ONDANSETRON 4 MG/1
4 TABLET, ORALLY DISINTEGRATING ORAL EVERY 8 HOURS PRN
Qty: 20 TABLET | Refills: 1 | Status: SHIPPED | OUTPATIENT
Start: 2021-07-18 | End: 2021-07-19

## 2021-07-18 RX ORDER — DIPHENHYDRAMINE HYDROCHLORIDE 50 MG/ML
25 INJECTION INTRAMUSCULAR; INTRAVENOUS ONCE
Status: COMPLETED | OUTPATIENT
Start: 2021-07-18 | End: 2021-07-18

## 2021-07-18 RX ADMIN — SODIUM CHLORIDE: 9 INJECTION, SOLUTION INTRAVENOUS at 01:32

## 2021-07-18 RX ADMIN — DIPHENHYDRAMINE HYDROCHLORIDE 25 MG: 50 INJECTION INTRAMUSCULAR; INTRAVENOUS at 01:31

## 2021-07-18 RX ADMIN — PANTOPRAZOLE SODIUM 40 MG: 40 TABLET, DELAYED RELEASE ORAL at 06:11

## 2021-07-18 ASSESSMENT — PAIN SCALES - GENERAL
PAINLEVEL_OUTOF10: 0
PAINLEVEL_OUTOF10: 0

## 2021-07-18 ASSESSMENT — ENCOUNTER SYMPTOMS: ABDOMINAL PAIN: 1

## 2021-07-18 NOTE — PROGRESS NOTES
PROGRESS NOTE      Patient:  Doni Farmer      Unit/Bed:5K-12/012-A    YOB: 1990    MRN: [id number]       Acct: [de-identified]     PCP: MURIEL Ramirez CNP    Date of Admission: 7/15/2021      Assessment/Plan:    Acute elevation in LFTs and bilirubin  On admission, alk phos 210, , , bilirubin 6.1, direct bilirubin 5.3, , lipase 67.3. Acetaminophen level negative  7/15/21 right upper quadrant ultrasound showed no acute abnormalities  7/16/21 MRCP was significant for moderate to severe splenomegaly, small retroperitoneal lymph nodes, small hiatal hernia, otherwise negative MRCP  GI on board, appreciate recommendations- suspect viral etiology (mono) despite negative mono screen   CMP, INR daily  HIV, EBV panel, CMV, HBsAb, autoimmune labs pending  Hold any hepatotoxic drugs  7/18 some improvement noted with downtrending LFTs. Appreciate GI consult. EBV panel seems to indicate past infection with current immunity. Hepatitis B surface antibody is positive with negative antigen. CMV is pending. Today patient reports overall clinical improvement. Would like to stay 1 more day and potentially go home tomorrow to follow-up outpatient with PCP and/or hematology.     SIRS positive on admission  On admission, patient meeting 2/4 SIRS criteria with elevated respiratory rate as well as leukopenia. Patient nearly met fever threshold with a temperature of 37.9C  On admission, Pro-Herb 0.49, UA not indicative of UTI, WBC 1.7  Likely Viral respiratory disease  Respiratory viral panel negative  Continue to monitor for signs or symptoms of infection  7/18 parameters of resolved. Patient is feeling better    Leukopenia  On admission today WBC was 1.7, currently 2.0  No signs or symptoms of infection  Suspect reactive process to a viral illness  Continue to monitor, CBC in a.m. Heme-onc consulted  7/18 improvement noted today.   Trending up    Thrombocytopenia  On admission platelet diarrhea/constipation, dysuria.     In the ED, patient's initial vitals showed a temperature of 100.3 Fahrenheit, respirations 22, pulse 86, blood pressure 135/87, satting 96% on room air. Patient's labs were significant as follows: Sodium 130, pro-Herb elevated 0.49. LFTs elevated (alk phos 210, , , bilirubin 6.1, direct bilirubin 5.3, ), lipase minimally elevated this 67.3, leukopenia to 1.7, thrombocytopenia to 80. Of note, patient's LFTs on 7/11 were largely within normal limits. Patient's UA was positive for large amounts of bilirubin, not indicative of any UTI. Right upper quadrant ultrasound was performed that showed no acute findings. Subjective:   Patient says that he is feeling better today. More energy but still feels fuzzy in his head. Denies any chest pain or shortness of breath. Denies abdominal pain. Had a normal bowel movement this morning. Less fatigue. Improving appetite      Medications:  Reviewed    Infusion Medications    sodium chloride 50 mL/hr at 07/18/21 0132    sodium chloride       Scheduled Medications    pantoprazole  40 mg Oral QAM AC    ibuprofen  400 mg Oral Once    sodium chloride flush  10 mL Intravenous 2 times per day     PRN Meds: calcium carbonate, sodium chloride flush, sodium chloride, ondansetron **OR** ondansetron, polyethylene glycol      Intake/Output Summary (Last 24 hours) at 7/18/2021 1121  Last data filed at 7/18/2021 0426  Gross per 24 hour   Intake 2426.92 ml   Output 0 ml   Net 2426.92 ml       Diet:  ADULT DIET; Regular    Exam:  BP (!) 120/55   Pulse 72   Temp 98.3 °F (36.8 °C) (Oral)   Resp 18   Ht 5' 8\" (1.727 m)   Wt 247 lb 14.4 oz (112.4 kg)   SpO2 93%   BMI 37.69 kg/m²     General appearance: No apparent distress, appears stated age and cooperative. Laying in bed, accompanied by wife  HEENT: Conjunctivae/corneas clear. Neck: Supple, with full range of motion. No jugular venous distention.  Trachea midline. Respiratory:  Normal respiratory effort. Clear to auscultation, bilaterally without Rales/Wheezes/Rhonchi. Cardiovascular: Regular rate and rhythm with normal S1/S2 without murmurs, rubs or gallops. Psychiatric: Alert and oriented, thought content appropriate, normal insight        Labs:   Recent Labs     07/16/21  0637 07/17/21  0555 07/18/21  0600   WBC 1.6* 2.0* 2.1*   HGB 14.6 14.2 14.1   HCT 42.4 40.5* 40.6*   PLT 66* 63* 78*     Recent Labs     07/17/21  0555 07/17/21  1529 07/18/21  0600   * 133* 132*   K 3.9 3.8 3.8   CL 99 99 99   CO2 21* 24 22*   BUN 8 9 8   CREATININE 0.8 0.8 0.7   CALCIUM 7.8* 8.3* 8.2*     Recent Labs     07/15/21  1240 07/16/21  0637 07/17/21  0555 07/17/21  1529 07/18/21  0600   *   < > 635* 600* 460*   *   < > 598* 643* 572*   BILIDIR 5.3*  --   --   --  2.1*   BILITOT 6.1*   < > 4.3* 4.4* 2.9*   ALKPHOS 210*   < > 224* 251* 247*    < > = values in this interval not displayed. Recent Labs     07/16/21  0637 07/17/21  0555   INR 1.16* 1.16*     No results for input(s): Marge Noel in the last 72 hours. Urinalysis:      Lab Results   Component Value Date    NITRU NEGATIVE 07/15/2021    WBCUA NONE 07/15/2021    BACTERIA NONE 07/15/2021    RBCUA NONE 07/15/2021    BLOODU TRACE 07/15/2021    SPECGRAV 1.020 06/25/2014    GLUCOSEU NEGATIVE 07/15/2021       Radiology:  MRI ABDOMEN W WO CONTRAST MRCP   Final Result   1. Negative MRCP. 2. There are no focal hepatic defects. There is no abnormal enhancement in the liver. 3. Moderate to severe splenomegaly. These findings would be suspicious for involvement by leukemia/lymphoma. Please correlate clinically. 4. Small retroperitoneal lymph nodes. 5. Small hiatal hernia.    6. Otherwise negative MRI scan of the abdomen with and without intravenous contrast.      Final report electronically signed by DR Eddy Garibay on 7/16/2021 11:44 AM      US GALLBLADDER RUQ   Final Result   There are no acute findings in the visualized upper portions of the abdomen. **This report has been created using voice recognition software. It may contain minor errors which are inherent in voice recognition technology. **      Final report electronically signed by Dr Ragini Shell on 7/15/2021 3:40 PM      IR BIOPSY BONE MARROW    (Results Pending)       Diet: ADULT DIET;  Regular    DVT prophylaxis: [] Lovenox                                 [x] SCDs                                 [] SQ Heparin                                 [x] Encourage ambulation           [] Already on Anticoagulation     Disposition:    [x] Home       [] TCU       [] Rehab       [] Psych       [] SNF       [] [facility]       [] Other-    Code Status: Full Code    PT/OT Eval Status:       Electronically signed by Paula Hood DO on 7/18/2021 at 11:21 AM

## 2021-07-18 NOTE — FLOWSHEET NOTE
Just a note to let you know that Rachael Pretty has a red rash on his arms, legs, trunk front and back   He has no discomfort at this time

## 2021-07-18 NOTE — DISCHARGE SUMMARY
DISCHARGE SUMMARY      Patient Identification:   Doni Farmer   : 1990  MRN: [id number]   Account: [de-identified]      Patient's PCP: MURIEL Ramirez CNP    Admit Date: 7/15/2021     Discharge Date:   21     Admitting Physician: Jacoby Stephens DO     Discharge Physician: Jacoby Stephens DO     Discharge Diagnoses: Active Hospital Problems    Diagnosis Date Noted    [facility] [D69.6] 2021    Leukopenia [D72.819] 2021    Elevated LFTs [R79.89] 07/15/2021       The patient was seen and examined on day of discharge and this discharge summary is in conjunction with any daily progress note from day of discharge. Hospital Course:   Doni Farmer is a 27 y.o. male admitted to [address] [place] [address] on 7/15/2021 for thrombocytopenia, elevated lfts, leukopenia. Kaiden Cardona was admitted on 7/15/21 due to low platelets and WBC and elevated LFTs. GI and Hematology were consulted. Workup was performed including peripheral blood smear which showed no abnormal or concerning morphology, EBV antibodies which indicated previous infection (not acute), CMV, INR, HIV, several autoimmune labs, hepatitis panel, and MRCP which were all normal or negative. While his labs trended in the wrong direction the day after admission, the abnormalities listed above began to resolve along with clinical improvement on the day of discharge. Kaiden Cardona was offered a bone marrow biopsy by hem/onc but was not sure he wanted to go through with it. He is comfortable going home and trending CBC and CMPs with his PCP to ensure resolution. Our current belief is that this is a response to a virus and should resolve within the next few weeks. If not, GI and hematology are happy to see him outpatient.         Exam:     Vitals:  Vitals:    21 2302 21 0426 21 0431 21 0815   BP:  (!) 153/66  (!) 120/55   Pulse:  79  72   Resp:  16  18   Temp: 99.2 °F (37.3 °C)  97.8 °F (36.6 °C) 98.3 °F (36.8 °C)   TempSrc: Oral  Oral Oral   SpO2:   93% 93%   Weight:       Height:         Weight: Weight: 247 lb 14.4 oz (112.4 kg)     24 hour intake/output:    Intake/Output Summary (Last 24 hours) at 7/18/2021 1439  Last data filed at 7/18/2021 1314  Gross per 24 hour   Intake 2666.92 ml   Output 0 ml   Net 2666.92 ml         General appearance:  No apparent distress, appears stated age and cooperative. HEENT:  Normal cephalic, atraumatic without obvious deformity. Pupils equal, round, and reactive to light. Extra ocular muscles intact. Conjunctivae/corneas clear. Neck: Supple, with full range of motion. No jugular venous distention. Trachea midline. Respiratory:  Normal respiratory effort. Clear to auscultation, bilaterally without Rales/Wheezes/Rhonchi. Cardiovascular:  Regular rate and rhythm with normal S1/S2 without murmurs, rubs or gallops. Skin: Skin color, texture, turgor normal.  No rashes or lesions. Neurologic:  Neurovascularly intact without any focal sensory/motor deficits. Cranial nerves: II-XII intact, grossly non-focal.  Psychiatric:  Alert and oriented, thought content appropriate, normal insight  Capillary Refill: Brisk,< 3 seconds   Peripheral Pulses: +2 palpable, equal bilaterally       Labs: For convenience and continuity at follow-up the following most recent labs are provided:      CBC:    Lab Results   Component Value Date    WBC 2.1 07/18/2021    HGB 14.1 07/18/2021    HCT 40.6 07/18/2021    PLT 78 07/18/2021       Renal:    Lab Results   Component Value Date     07/18/2021    K 3.8 07/18/2021    K 4.2 07/15/2021    CL 99 07/18/2021    CO2 22 07/18/2021    BUN 8 07/18/2021    CREATININE 0.7 07/18/2021    CALCIUM 8.2 07/18/2021         Significant Diagnostic Studies    Radiology:   MRI ABDOMEN W WO CONTRAST MRCP   Final Result   1. Negative MRCP. 2. There are no focal hepatic defects. There is no abnormal enhancement in the liver. 3. Moderate to severe splenomegaly. Susy Tabor DO on 7/18/2021 at 2:39 PM

## 2021-07-18 NOTE — PROGRESS NOTES
Pt Name: Rommel Hilliard  MRN: [id number]  [id number]  YOB: 1990  Admit Date: 7/15/2021 11:48 AM  Date of evaluation: 7/18/2021  Primary Care Physician: MURIEL Amaya - CNP   5K-12/012-[facility] FATIMA. Feels better. Wants to go home. BM bx ordered but pt very reticent to do. O.     Vitals:    07/18/21 0815   BP: (!) 120/55   Pulse: 72   Resp: 18   Temp: 98.3 °F (36.8 °C)   SpO2: 93%       Body mass index is 37.69 kg/m². Awake and alert   clear to auscultation bilaterally   regular rate and rhythm   Soft and nondistended with normal BS, nontender, + SM   no cyanosis, clubbing or edema present      Current Meds    pantoprazole  40 mg Oral QAM AC    ibuprofen  400 mg Oral Once    sodium chloride flush  10 mL Intravenous 2 times per day      sodium chloride 50 mL/hr at 07/18/21 0132    sodium chloride       Diet: ADULT DIET; Regular    A.  27 y.o. male was admitted 7/11/21 for fatigue, body aches, fever and chills, jaundice and splenomegaly.     Acute hepatitis, likely viral mono - monoscreen negative  Hyperbilirubinemia, direct  Elevated LFT's/transaminases - improving  Splenomegaly  Thrombocytopenia - secondary to SM  Leukopenia - secondary to SM  Hyponatremia - improved  hypoalbuminemia  Fatigue, generalized weakness  Fever- resolved  HBsAb+, HBsAg-, HAV IgM-, HCV-, HBcAb IgM-       P.      · Monitor CBC daily  · Monitor HFP daily  · Avoid hepatotoxic meds  · Diet as tolerated  · Supportive care      Await CMV, EBV Ab's, Autoimmune labs  Check HBcAb - total    Ok per GI to discharge  Recheck cbc, LFT's one week   Follow-up with GI in 2 weeks  Recheck HCV Ab in 6m     Isolation not required        Davina Hummel MD, MD  1:02 PM 7/18/2021

## 2021-07-19 VITALS
BODY MASS INDEX: 36.75 KG/M2 | HEART RATE: 80 BPM | SYSTOLIC BLOOD PRESSURE: 124 MMHG | OXYGEN SATURATION: 91 % | HEIGHT: 68 IN | WEIGHT: 242.5 LBS | DIASTOLIC BLOOD PRESSURE: 73 MMHG | RESPIRATION RATE: 16 BRPM | TEMPERATURE: 97.6 F

## 2021-07-19 LAB
ALBUMIN SERPL-MCNC: 3.7 G/DL (ref 3.5–5.1)
ALP BLD-CCNC: 261 U/L (ref 38–126)
ALT SERPL-CCNC: 525 U/L (ref 11–66)
ANION GAP SERPL CALCULATED.3IONS-SCNC: 11 MEQ/L (ref 8–16)
AST SERPL-CCNC: 329 U/L (ref 5–40)
BILIRUB SERPL-MCNC: 2.2 MG/DL (ref 0.3–1.2)
BUN BLDV-MCNC: 7 MG/DL (ref 7–22)
CALCIUM SERPL-MCNC: 8.6 MG/DL (ref 8.5–10.5)
CHLORIDE BLD-SCNC: 101 MEQ/L (ref 98–111)
CMV IGM: < 8 AU/ML
CO2: 23 MEQ/L (ref 23–33)
CREAT SERPL-MCNC: 0.9 MG/DL (ref 0.4–1.2)
CYTOMEGALOVIRUS IGG ANTIBODY: < 0.2 U/ML
ERYTHROCYTE [DISTWIDTH] IN BLOOD BY AUTOMATED COUNT: 13.1 % (ref 11.5–14.5)
ERYTHROCYTE [DISTWIDTH] IN BLOOD BY AUTOMATED COUNT: 41.8 FL (ref 35–45)
GFR SERPL CREATININE-BSD FRML MDRD: > 90 ML/MIN/1.73M2
GLUCOSE BLD-MCNC: 101 MG/DL (ref 70–108)
HCT VFR BLD CALC: 44.6 % (ref 42–52)
HEMOGLOBIN: 15.1 GM/DL (ref 14–18)
MCH RBC QN AUTO: 29.6 PG (ref 26–33)
MCHC RBC AUTO-ENTMCNC: 33.9 GM/DL (ref 32.2–35.5)
MCV RBC AUTO: 87.5 FL (ref 80–94)
MITOCHONDRIAL M2 AB, IGG: < 0.5 U/ML (ref 0–4)
PLATELET # BLD: 114 THOU/MM3 (ref 130–400)
PMV BLD AUTO: 12.6 FL (ref 9.4–12.4)
POTASSIUM SERPL-SCNC: 3.9 MEQ/L (ref 3.5–5.2)
RBC # BLD: 5.1 MILL/MM3 (ref 4.7–6.1)
SODIUM BLD-SCNC: 135 MEQ/L (ref 135–145)
TOTAL PROTEIN: 6.2 G/DL (ref 6.1–8)
WBC # BLD: 2.7 THOU/MM3 (ref 4.8–10.8)

## 2021-07-19 PROCEDURE — 2580000003 HC RX 258: Performed by: STUDENT IN AN ORGANIZED HEALTH CARE EDUCATION/TRAINING PROGRAM

## 2021-07-19 PROCEDURE — 6370000000 HC RX 637 (ALT 250 FOR IP): Performed by: INTERNAL MEDICINE

## 2021-07-19 PROCEDURE — 80053 COMPREHEN METABOLIC PANEL: CPT

## 2021-07-19 PROCEDURE — 85027 COMPLETE CBC AUTOMATED: CPT

## 2021-07-19 PROCEDURE — 36415 COLL VENOUS BLD VENIPUNCTURE: CPT

## 2021-07-19 PROCEDURE — 99233 SBSQ HOSP IP/OBS HIGH 50: CPT | Performed by: NURSE PRACTITIONER

## 2021-07-19 PROCEDURE — 99239 HOSP IP/OBS DSCHRG MGMT >30: CPT | Performed by: FAMILY MEDICINE

## 2021-07-19 RX ORDER — ONDANSETRON 4 MG/1
4 TABLET, ORALLY DISINTEGRATING ORAL EVERY 8 HOURS PRN
Qty: 15 TABLET | Refills: 1 | Status: SHIPPED | OUTPATIENT
Start: 2021-07-19

## 2021-07-19 RX ORDER — PANTOPRAZOLE SODIUM 40 MG/1
40 TABLET, DELAYED RELEASE ORAL
Qty: 30 TABLET | Refills: 0 | Status: SHIPPED | OUTPATIENT
Start: 2021-07-20

## 2021-07-19 RX ADMIN — SODIUM CHLORIDE: 9 INJECTION, SOLUTION INTRAVENOUS at 05:18

## 2021-07-19 RX ADMIN — PANTOPRAZOLE SODIUM 40 MG: 40 TABLET, DELAYED RELEASE ORAL at 05:13

## 2021-07-19 NOTE — PROGRESS NOTES
[facility]    Patient - Apolonia Reid   MRN -  [id number]   Acct # - [de-identified]   - 1990      Date of Admission -  7/15/2021 11:48 AM  Date of evaluation -  2021  Room - -12/012-A   Hospital Day - [facility],  Primary Care Physician - MURIEL Boyce - CNP       Reason for Consult    Neutropenia  Thrombocytopenia  Splenomegaly  Hepatitis  fevers  Active Hospital Problem List      Active Hospital Problems    Diagnosis Date Noted    Thrombocytopenia ([address] [D69.6] 2021    Leukopenia [D72.819] 2021    Jaundice, non- [R17]     Elevated LFTs [R79.89] 07/15/2021     HPI/Subjective   Apolonia Reid is a 27 y.o. male admitted for evaluation of hepatitis, splenomegaly, and pancytopenia. Pt reports his symptoms started approximately 2021. Pt presented to ED on 2021 with fatigue, body aches, fevers, mental fogginess. WBC 2.6. Temp 100.8. Pt given diagnosis of acute sinusitis and discharged home. Pt returned to ED on 7/15/2021 with dark urine, fatigue, fever, chills. 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 Pt denied recent travel, sick contacts. WBC on 7/15/2021 1.7, platelets 80. Virology studies drawn, questionable mono. 2021 Flu A/B (-)(-)  HIV (-). Mono 2021 (-).  EBV (-) acute infection. CMV (-). Hep B S Ab (+). Path review, smear (+) leukopenia, thrombocytopenia without significant abnormalities. MRI Abd (+) moderate to severe splenomegaly, suspicious for involvement by leukemia/lymphoma; small retroperitoneal lymph nodes. Flow cytometry pending. 2021:  Pt resting in bed, family at bedside. Pt reports he still feels \"foggy\". Pt denies H/A, dizziness, cough, SOB, CP, abd pain, N/V/C/D, peripheral edema, drenching night sweats (dampening at times), unintentional weight loss. No cervical/supraclavicular lymphadenopathy noted. Tmax overnight 99.1.       Oncology History   None  Meds    Current Medications    pantoprazole  40 mg Oral QAM AC    ibuprofen  400 mg Oral Once    sodium chloride flush  10 mL Intravenous 2 times per day     calcium carbonate, sodium chloride flush, sodium chloride, ondansetron **OR** ondansetron, polyethylene glycol  IV Drips/Infusions   sodium chloride 50 mL/hr at 07/19/21 0518    sodium chloride       Past Medical History         Diagnosis Date    Hepatitis 7/15/2021      Past Surgical History           Procedure Laterality Date    SINUS SURGERY      TONSILLECTOMY       Diet    Diet NPO  Allergies    Patient has no known allergies. Social History     Social History     Socioeconomic History    Marital status:      Spouse name: Not on file    Number of children: Not on file    Years of education: Not on file    Highest education level: Not on file   Occupational History    Not on file   Tobacco Use    Smoking status: Never Smoker    Smokeless tobacco: Current User   Substance and Sexual Activity    Alcohol use: Not on file    Drug use: Not on file    Sexual activity: Not on file   Other Topics Concern    Not on file   Social History Narrative    Not on file     Social Determinants of Health     Financial Resource Strain: Low Risk     Difficulty of Paying Living Expenses: Not hard at all   Food Insecurity: No Food Insecurity    Worried About [facility] in the Last Year: Never true    [facility] in the Last Year: Never true   Transportation Needs:     Lack of Transportation (Medical):      Lack of Transportation (Non-Medical):    Physical Activity:     Days of Exercise per Week:     Minutes of Exercise per Session:    Stress:     Feeling of Stress :    Social Connections:     Frequency of Communication with Friends and Family:     Frequency of Social Gatherings with Friends and Family:     Attends Judaism Services:     Active Member of Clubs or Organizations:     Attends Club or Organization Meetings:     Marital Status:    Intimate Partner Violence:     Fear of Current or Ex-Partner:     Emotionally Abused:     Physically Abused:     Sexually Abused:      Family History          Problem Relation Age of Onset    Diabetes Mother     Cancer Paternal Grandfather      ROS     Review of Systems   Pertinent review of systems noted in HPI, all other ROS negative. Vitals     height is 5' 8\" (1.727 m) and weight is 242 lb 8 oz (110 kg). His oral temperature is 97.6 °F (36.4 °C). His blood pressure is 124/73 and his pulse is 80. His respiration is 16 and oxygen saturation is 91%. Exam   Physical Exam   General appearance: No apparent distress, calm and cooperative. Well developed. HEENT: Pupils equal, round, and reactive to light. Conjunctivae/corneas clear. Oral mucosa moist.  Neck: Supple, with full range of motion. Trachea midline. No cervical/supraclavicular lymphadenopathy noted. Respiratory:  Normal respiratory effort. Clear to auscultation all lung fields. Cardiovascular:  RRR, S1/S2. Abdomen: Soft, non-tender, non-distended with active BS x 4. Musculoskeletal: No clubbing, cyanosis or edema bilaterally. Pt resting in bed, reportedly ambulating in room without difficulty. Skin: Skin color, texture, turgor normal.  No visible rashes or lesions. Neurologic:  Neurovascularly intact without any focal sensory/motor deficits.  Cranial nerves: II-XII intact, grossly non-focal.  Psychiatric: Alert and oriented x 3, thought content appropriate, normal insight  Capillary Refill: Brisk,< 3 seconds   Peripheral Pulses: +2 palpable, equal bilaterally        Labs   CBC  Recent Labs     07/17/21  0555 07/18/21  0600 07/19/21  0814   WBC 2.0* 2.1* 2.7*   RBC 4.78 4.77 5.10   HGB 14.2 14.1 15.1   HCT 40.5* 40.6* 44.6   MCV 84.7 85.1 87.5   MCH 29.7 29.6 29.6   MCHC 35.1 34.7 33.9   PLT 63* 78* 114*   MPV 12.6* 12.8* 12.6*      BMP  Recent Labs     07/17/21  1529 07/18/21  0600 07/19/21  0814   NA 133* 132* 135   K 3.8 3.8 3.9   CL 99 99 101   CO2 24 22* 23   BUN 9 8 7   CREATININE 0.8 0.7 0.9   GLUCOSE 101 119* 101   CALCIUM 8.3* 8.2* 8.6     LFT  Recent Labs     07/17/21  1529 07/18/21  0600 07/19/21  0814   * 460* 329*   * 572* 525*   BILITOT 4.4* 2.9* 2.2*   ALKPHOS 251* 247* 261*     INR  Recent Labs     07/17/21  0555   INR 1.16*     PTT  No results for input(s): APTT in the last 72 hours. Radiology        US GALLBLADDER RUQ    Result Date: 7/15/2021  PROCEDURE: US GALLBLADDER RUQ CLINICAL INFORMATION: 79-year-old male with jaundice and fever. COMPARISON: No prior study. TECHNIQUE: Multiplanar sonographic images were obtained of the structures in the right upper quadrant. FINDINGS: Gallbladder - 6.1 x 2.4 x 2.1 cm Gallbladder Wall - 0.31 cm Common Duct - 0.36 cm Sosa's Sign: negative The liver is of normal echogenicity. No masses are noted. The pancreatic head and body are within normal limits. The tail is not well seen due to overlying bowel gas. The gallbladder is not well distended. There is no pericholecystic fluid or gallbladder wall edema. No gallstones are identified. The common bile duct is normal and measures 4 mm. There is no hydronephrosis in the visualized aspects of the right kidney. There are no acute findings in the visualized upper portions of the abdomen. **This report has been created using voice recognition software. It may contain minor errors which are inherent in voice recognition technology. ** Final report electronically signed by Dr Cheryl Samuel on 7/15/2021 3:40 PM    XR CHEST PORTABLE    Result Date: 7/11/2021  PROCEDURE: XR CHEST PORTABLE CLINICAL INFORMATION: cough, fever COMPARISON: 2/27/2010 TECHNIQUE: A single mobile view of the chest was obtained. Normal mobile chest. **This report has been created using voice recognition software. It may contain minor errors which are inherent in voice recognition technology. ** Final report electronically signed by Dr. Keon London on 7/11/2021 6:38 PM    MRI ABDOMEN W WO CONTRAST MRCP    Result Date: 7/16/2021  EXAMINATION: MRI SCAN OF THE ABDOMEN W WO CONTRAST MRCP. HISTORY: 42-year-old patient with jaundice. Acute hepatitis. Acute leukopenia. TECHNIQUE: MRI scan was carried out through the abdomen before and after intravenous administration of 20 mL of ProHance. MRCP was performed. COMPARISON: Gallbladder ultrasound dated 15th of July 2021. FINDINGS: Lung bases: Normal. Liver: There are no focal hepatic defects. There is no abnormal enhancement. Gallbladder/biliary system. There are no gallstones, gallbladder wall thickening or pericholecystic fluid. There is no intra or extrahepatic biliary dilatation. There is no evidence of choledocholithiasis. Spleen: There is moderate to severe splenomegaly. These findings would be suspicious for possible   involvement by leukemia/lymphoma. Please correlate clinically. Adrenal glands: Normal. Pancreas: Normal. Kidneys: Normal. Vascular: There appears be normal flow in the abdominal aorta, inferior vena cava and portal vein. Abdominal cavity. There is a small hiatal hernia. There is no ascites. There are small retroperitoneal lymph nodes. . MUSCULOSKELETAL: Unremarkable. 1. Negative MRCP. 2. There are no focal hepatic defects. There is no abnormal enhancement in the liver. 3. Moderate to severe splenomegaly. These findings would be suspicious for involvement by leukemia/lymphoma. Please correlate clinically. 4. Small retroperitoneal lymph nodes. 5. Small hiatal hernia. 6. Otherwise negative MRI scan of the abdomen with and without intravenous contrast. Final report electronically signed by DR Eddy Garibay on 7/16/2021 11:44 AM      Assessment/Recommendations    1. Neutropenia - WBC improving, up to 2.7 today. Tmax overnight 99.1. MRI Abd (+) moderate to severe splenomegaly, suspicious for involvement by leukemia/lymphoma; small retroperitoneal lymph nodes. Flow cytometry pending. Ok to hold off on BM biopsy for now, ok for discharge from our standpoint. Pt wishes to follow up with PCP for results from Flow Cytometry. If (+), will need to see us as outpt and likely need BM biopsy. Pt verbalizes understanding of this. 2.  Thrombocytopenia - Platelet count improving, up to 114 today. Splenomegaly seen on MRI Abd. Trend. Infectious workup (-). Hep B S Ab (+). Likely reactive. Follow flow cytometry results. Case discussed with nurse and patient/family. Questions and concerns addressed. Plan made in collaboration with Dr. Taty Riley.     Electronically signed by   MURIEL Pierce CNP on 7/19/2021 at 12:35 PM

## 2021-07-19 NOTE — DISCHARGE SUMMARY
DISCHARGE SUMMARY      Patient Identification:   Kerry Herrera   : 1990  MRN: [id number]   Account: [de-identified]      Patient's PCP: MURIEL Moore CNP    Admit Date: 7/15/2021     Discharge Date: 2021     Admitting Physician: Cosmo Fry DO     Discharge Physician: Doretha Burris MD     Discharge Diagnoses:    Acute elevation in LFTs and bilirubin, improving  On admission, alk phos 210, , , bilirubin 6.1, direct bilirubin 5.3, , lipase 67.3. Acetaminophen level negative  7/15/21 right upper quadrant ultrasound showed no acute abnormalities  21 MRCP was significant for moderate to severe splenomegaly, small retroperitoneal lymph nodes, small hiatal hernia, otherwise negative MRCP  GI on board, appreciate recommendations- suspect viral etiology (mono)  EBV panel seems to indicate past infection with current immunity. Hepatitis B surface antibody is positive with negative antigen. SIRS positive on admission  On admission, patient meeting 2/4 SIRS criteria with elevated respiratory rate as well as leukopenia.  Patient nearly met fever threshold with a temperature of 37.9C  On admission, Pro-Herb 0.49, UA not indicative of UTI, WBC 1.7  Likely Viral process     Leukopenia, improving  On admission today WBC was 1.7  No signs or symptoms of infection  Suspect reactive process to a viral illness     Thrombocytopenia, improving  On admission platelet count 19,641  ?  Splenic sequestration  Likely reactive   Blood smear showed leukopenia and thrombocytopenia, no abnormal morphology     Hyponatremia, stable     Elevated lipase- Likely nonsignificant     Reflux- Protonix 40 mg daily    The patient was seen and examined on day of discharge and this discharge summary is in conjunction with any daily progress note from day of discharge.     Hospital Course:   Kerry Herrera is a 27 y.o. male admitted to [address] [place] [address] on 7/15/2021 for thrombocytopenia, elevated lfts, leukopenia.         Ko Ramirez was admitted on 7/15/21 due to low platelets and WBC and elevated LFTs. GI and Hematology were consulted. Workup was performed including peripheral blood smear which showed no abnormal or concerning morphology, EBV antibodies which indicated previous infection (not acute), CMV, INR, HIV, several autoimmune labs, hepatitis panel, and MRCP which were all normal or negative. While his labs trended in the wrong direction the day after admission, the abnormalities listed above began to resolve along with clinical improvement. Bone marrow biopsy was under consideration by heme-onc however it was later deemed unnecessary as he was improving. He is comfortable going home and trending CBC and CMPs with his PCP to ensure resolution. GI would like follow-up outpatient as well. Our current belief is that this is a response to a virus and should resolve within the next few weeks. Exam:     Vitals:  Vitals:    07/18/21 2100 07/19/21 0243 07/19/21 0500 07/19/21 0959   BP: 122/77 118/60  124/73   Pulse: 84 86  80   Resp: 18 16  16   Temp: 99.1 °F (37.3 °C) 99.1 °F (37.3 °C)  97.6 °F (36.4 °C)   TempSrc: Oral Oral  Oral   SpO2: 95% 91%  91%   Weight:   242 lb 8 oz (110 kg)    Height:   5' 8\" (1.727 m)      Weight: Weight: 242 lb 8 oz (110 kg)     24 hour intake/output:    Intake/Output Summary (Last 24 hours) at 7/19/2021 1644  Last data filed at 7/19/2021 0514  Gross per 24 hour   Intake 2681.56 ml   Output --   Net 2681.56 ml     General appearance:  No apparent distress, appears stated age and cooperative. Sitting in chair, company by wife  HEENT:  Normal cephalic, atraumatic without obvious deformity. Extra ocular muscles intact. Conjunctivae/corneas clear. Neck: Supple, with full range of motion. No jugular venous distention. Trachea midline. Respiratory:  Normal respiratory effort. Clear to auscultation, bilaterally without Rales/Wheezes/Rhonchi.   Cardiovascular:  Regular rate and rhythm with normal S1/S2 without murmurs, rubs or gallops. Abdomen: Soft, non-tender, non-distended with normal bowel sounds. Musculoskeletal:  No clubbing, cyanosis or edema bilaterally. Full range of motion without deformity. Skin: Diffuse erythematous rash across body, nontender  Neurologic:  Neurovascularly intact without any focal sensory/motor deficits. Cranial nerves: II-XII intact, grossly non-focal.  Psychiatric:  Alert and oriented, thought content appropriate, normal insight  Capillary Refill: Brisk,< 3 seconds   Peripheral Pulses: +2 palpable, equal bilaterally     Labs: For convenience and continuity at follow-up the following most recent labs are provided:  CBC:    Lab Results   Component Value Date    WBC 2.7 07/19/2021    HGB 15.1 07/19/2021    HCT 44.6 07/19/2021     07/19/2021       Renal:    Lab Results   Component Value Date     07/19/2021    K 3.9 07/19/2021    K 4.2 07/15/2021     07/19/2021    CO2 23 07/19/2021    BUN 7 07/19/2021    CREATININE 0.9 07/19/2021    CALCIUM 8.6 07/19/2021         Significant Diagnostic Studies    Radiology:   MRI ABDOMEN W WO CONTRAST MRCP   Final Result   1. Negative MRCP. 2. There are no focal hepatic defects. There is no abnormal enhancement in the liver. 3. Moderate to severe splenomegaly. These findings would be suspicious for involvement by leukemia/lymphoma. Please correlate clinically. 4. Small retroperitoneal lymph nodes. 5. Small hiatal hernia. 6. Otherwise negative MRI scan of the abdomen with and without intravenous contrast.      Final report electronically signed by DR Eddy Garibay on 7/16/2021 11:44 AM      US GALLBLADDER RUQ   Final Result   There are no acute findings in the visualized upper portions of the abdomen. **This report has been created using voice recognition software. It may contain minor errors which are inherent in voice recognition technology. **      Final report electronically signed by Dr Jordan Carlisle on 7/15/2021 3:40 PM             Consults:     IP CONSULT TO GI  IP CONSULT TO ONCOLOGY    Disposition:    [x] Home       [] TCU       [] Rehab       [] Psych       [] SNF       [] [place]       [] Other-    Condition at Discharge: Stable    Code Status:  Prior     Patient Instructions:    Discharge lab work: CMP and CBC in 3 days  Activity: activity as tolerated  Diet: No diet orders on file      Follow-up visits:   Ofelia Sacks, APRN - CNP  [address]  [phone]    Schedule an appointment as soon as possible for a visit on 7/23/2021  leukopenia, elevated LFTs, thrombocytopenia- needs CBC and CMP every 3-7 days to ensure resolution. Appointment 7-23-21 at 12:00PM (virtual visit)    [facility] [phone]    Call in 10 days  Appointment with Caitlin Gomez on August 2nd at 1pm. Call if need appointment sooner. Discharge Medications:      Cr Willett   Home Medication Instructions [id number]    Printed on:07/19/21 8430   Medication Information                      ondansetron (ZOFRAN-ODT) 4 MG disintegrating tablet  Take 1 tablet by mouth every 8 hours as needed for Nausea or Vomiting             pantoprazole (PROTONIX) 40 MG tablet  Take 1 tablet by mouth every morning (before breakfast)                 Time Spent on discharge is more than 30 minutes in the examination, evaluation, counseling and review of medications and discharge plan. Signed: Thank you Ofelia Sacks, APRN - CNP for the opportunity to be involved in this patient's care.     Electronically signed by Patel Olivas MD on 7/19/2021 at 4:44 PM

## 2021-07-19 NOTE — PROGRESS NOTES
Patient stated he is now agreeable to meet with Dr. Lauris Epley and discuss plan of care. Patient did have rash again all over body, states no itching or pain, Red splotchy patches on BUE, BLE, trunk, back.

## 2021-07-19 NOTE — PROGRESS NOTES
This RN called and spoke with LEXI Juan about the bone marrow biopsy. LEXI Juan stated patient is refusing and wants to talk with the doctor. This RN stated okay to call us [phone]  if the patient changes his mind.

## 2021-07-19 NOTE — CARE COORDINATION
Patient is discharged to home today with no services added/requested. 7/19/21, 9:12 AM EDT    Patient goals/plan/ treatment preferences discussed by  and . Patient goals/plan/ treatment preferences reviewed with patient/ family. Patient/ family verbalize understanding of discharge plan and are in agreement with goal/plan/treatment preferences. Understanding was demonstrated using the teach back method. AVS provided by RN at time of discharge, which includes all necessary medical information pertaining to the patients current course of illness, treatment, post-discharge goals of care, and treatment preferences.

## 2021-07-19 NOTE — PLAN OF CARE
Problem: Physical Regulation:  Goal: Will remain free from infection  Description: Will remain free from infection  Note: Patient has not exhibited any signs or symptoms of infection. Problem: Physical Regulation:  Goal: Ability to maintain vital signs within normal range will improve  Description: Ability to maintain vital signs within normal range will improve  Outcome: Ongoing  Note:   Vitals:    07/18/21 2100   BP: 122/77   Pulse: 84   Resp: 18   Temp: 99.1 °F (37.3 °C)   SpO2: 95%     Patient VSS     Problem: Infection:  Goal: Will remain free from infection  Description: Will remain free from infection  Note: Patient has not exhibited any signs or symptoms of infection. Problem: Daily Care:  Goal: Daily care needs are met  Description: Daily care needs are met  Outcome: Ongoing  Note: Patient daily care needs met this shift. Problem: Pain:  Goal: Patient's pain/discomfort is manageable  Description: Patient's pain/discomfort is manageable  Outcome: Ongoing  Note: Patient able to use 0-10 pain scale. Denies pain at this time. Agreeable to take PRN pain medications. Patient has not complained of any pain this shift. Patient stated pain goal is \"0/10\". Problem: Skin Integrity:  Goal: Skin integrity will stabilize  Description: Skin integrity will stabilize  Outcome: Ongoing  Note: No signs of skin breakdown. Skin clean, dry, intact. Mucous membranes pink, moist. Patient turns self. Assistance with turns/ambulation provided PRN. Continue to monitor. Patient has red rash all over body. Problem: Discharge Planning:  Goal: Patients continuum of care needs are met  Description: Patients continuum of care needs are met  Outcome: Ongoing  Note: Discharge planning in process and discussed with patient/family. Care manager aware of discharge needs. Patient is from home with wife and would like to return home at discharge. Discharge planning remains in progress.       Problem: Falls - Risk of:  Goal: Will remain free from falls  Description: Will remain free from falls  Outcome: Ongoing  Note: Patient was placed on fall precautions. Fall sign posted. Bed rails maintained at a minimum of 2/4. Call light in reach, bed in lowest position, bed alarm activated. Educated on use of call light before ambulation and when in need of assistance. Patient expressed understanding. Hourly visual checks performed and charted. Toileting offered to patient. No falls during shift, at this time. Arm band & falling star in place. Continuing to monitor. Care plan reviewed with patient and RN. Patient and RN verbalize understanding of the plan of care and contribute to goal setting.

## 2021-07-20 ENCOUNTER — TELEPHONE (OUTPATIENT)
Dept: FAMILY MEDICINE CLINIC | Age: 31
End: 2021-07-20

## 2021-07-20 LAB — EPSTEIN-BARR VIRUS ANTIBODIES: NORMAL

## 2021-07-20 NOTE — TELEPHONE ENCOUNTER
[facility] Initial Follow Up Call    Outreach made within 2 business days of discharge: Yes    Patient: Christina Dover Patient : 1990   MRN: [id number]  Reason for Admission: There are no discharge diagnoses documented for the most recent discharge. Discharge Date: 21       Spoke with: PatientViridiana    Discharge department/facility: [facility]    TCM Interactive Patient Contact:  Was patient able to fill all prescriptions: Yes  Was patient instructed to bring all medications to the follow-up visit: Yes  Is patient taking all medications as directed in the discharge summary?  Yes  Does patient understand their discharge instructions: Yes  Does patient have questions or concerns that need addressed prior to 7-14 day follow up office visit: no    Scheduled appointment with PCP within 7-14 days    Follow Up  Future Appointments   Date Time Provider Eleni Moser   2021  9:30 AM MURIEL More - CNP [place] [place], [place]

## 2021-07-21 ENCOUNTER — NURSE ONLY (OUTPATIENT)
Dept: LAB | Age: 31
End: 2021-07-21

## 2021-07-21 DIAGNOSIS — R17 JAUNDICE, NON-NEONATAL: ICD-10-CM

## 2021-07-21 LAB
ALBUMIN SERPL-MCNC: 4.2 G/DL (ref 3.5–5.1)
ALP BLD-CCNC: 182 U/L (ref 38–126)
ALT SERPL-CCNC: 286 U/L (ref 11–66)
ANA SCREEN: NORMAL
ANION GAP SERPL CALCULATED.3IONS-SCNC: 12 MEQ/L (ref 8–16)
AST SERPL-CCNC: 142 U/L (ref 5–40)
BILIRUB SERPL-MCNC: 1.6 MG/DL (ref 0.3–1.2)
BUN BLDV-MCNC: 8 MG/DL (ref 7–22)
CALCIUM SERPL-MCNC: 9.1 MG/DL (ref 8.5–10.5)
CHLORIDE BLD-SCNC: 103 MEQ/L (ref 98–111)
CO2: 25 MEQ/L (ref 23–33)
CREAT SERPL-MCNC: 0.8 MG/DL (ref 0.4–1.2)
ERYTHROCYTE [DISTWIDTH] IN BLOOD BY AUTOMATED COUNT: 13.1 % (ref 11.5–14.5)
ERYTHROCYTE [DISTWIDTH] IN BLOOD BY AUTOMATED COUNT: 41.8 FL (ref 35–45)
F-ACTIN AB IGG: 8 UNITS (ref 0–19)
GFR SERPL CREATININE-BSD FRML MDRD: > 90 ML/MIN/1.73M2
GLUCOSE BLD-MCNC: 99 MG/DL (ref 70–108)
HCT VFR BLD CALC: 44.7 % (ref 42–52)
HEMOGLOBIN: 15 GM/DL (ref 14–18)
LEUK/LYMPH PHENOTYPING WB: NORMAL
MCH RBC QN AUTO: 29.5 PG (ref 26–33)
MCHC RBC AUTO-ENTMCNC: 33.6 GM/DL (ref 32.2–35.5)
MCV RBC AUTO: 88 FL (ref 80–94)
NEUTROPHIL CYTOPLASMIC AB IGG: NORMAL
PLATELET # BLD: 197 THOU/MM3 (ref 130–400)
PMV BLD AUTO: 12.2 FL (ref 9.4–12.4)
POTASSIUM SERPL-SCNC: 4.4 MEQ/L (ref 3.5–5.2)
RBC # BLD: 5.08 MILL/MM3 (ref 4.7–6.1)
SODIUM BLD-SCNC: 140 MEQ/L (ref 135–145)
TOTAL PROTEIN: 6.3 G/DL (ref 6.1–8)
WBC # BLD: 3.9 THOU/MM3 (ref 4.8–10.8)

## 2021-07-23 ENCOUNTER — OFFICE VISIT (OUTPATIENT)
Dept: FAMILY MEDICINE CLINIC | Age: 31
End: 2021-07-23
Payer: COMMERCIAL

## 2021-07-23 VITALS
BODY MASS INDEX: 37.25 KG/M2 | SYSTOLIC BLOOD PRESSURE: 120 MMHG | RESPIRATION RATE: 16 BRPM | OXYGEN SATURATION: 96 % | WEIGHT: 245.8 LBS | TEMPERATURE: 97.1 F | HEART RATE: 73 BPM | HEIGHT: 68 IN | DIASTOLIC BLOOD PRESSURE: 80 MMHG

## 2021-07-23 DIAGNOSIS — K75.9 HEPATITIS: Primary | ICD-10-CM

## 2021-07-23 DIAGNOSIS — D69.6 THROMBOCYTOPENIA (HCC): ICD-10-CM

## 2021-07-23 DIAGNOSIS — R79.89 ELEVATED LFTS: ICD-10-CM

## 2021-07-23 PROCEDURE — 99496 TRANSJ CARE MGMT HIGH F2F 7D: CPT | Performed by: NURSE PRACTITIONER

## 2021-07-23 PROCEDURE — 1111F DSCHRG MED/CURRENT MED MERGE: CPT | Performed by: NURSE PRACTITIONER

## 2021-07-23 ASSESSMENT — ENCOUNTER SYMPTOMS
RESPIRATORY NEGATIVE: 1
EYES NEGATIVE: 1
GASTROINTESTINAL NEGATIVE: 1

## 2021-07-23 NOTE — PROGRESS NOTES
Post-Discharge Transitional Care Management Services or Hospital Follow Up      Sancho Gutierrez   YOB: 1990    Date of Office Visit:  2021  Date of Hospital Admission: 7/15/21  Date of Hospital Discharge: 21  Readmission Risk Score(high >=14%. Medium >=10%):Readmission Risk Score: 6      Care management risk score Rising risk (score 2-5) and Complex Care (Scores >=6): 0     Non face to face  following discharge, date last encounter closed (first attempt may have been earlier): 2021 11:58 AM 2021 11:58 AM    Call initiated 2 business days of discharge: Yes     Patient Active Problem List   Diagnosis    Elevated LFTs    Thrombocytopenia (HCC)    Leukopenia    Jaundice, non-       No Known Allergies    Medications listed as ordered at the time of discharge from hospital   Dakota Schumacher   [place] Medication Instructions TED:    Printed on:21 0123   Medication Information                      ondansetron (ZOFRAN-ODT) 4 MG disintegrating tablet  Take 1 tablet by mouth every 8 hours as needed for Nausea or Vomiting             pantoprazole (PROTONIX) 40 MG tablet  Take 1 tablet by mouth every morning (before breakfast)                   Medications marked \"taking\" at this time  Outpatient Medications Marked as Taking for the 21 encounter (Office Visit) with MURIEL Don CNP   Medication Sig Dispense Refill    pantoprazole (PROTONIX) 40 MG tablet Take 1 tablet by mouth every morning (before breakfast) 30 tablet 0    ondansetron (ZOFRAN-ODT) 4 MG disintegrating tablet Take 1 tablet by mouth every 8 hours as needed for Nausea or Vomiting 15 tablet 1        Medications patient taking as of now reconciled against medications ordered at time of hospital discharge: Yes    Chief Complaint   Patient presents with    Follow-Up from Hospital     high ast/alt low platelet/wbc       HPI  Pt admitted with acute hepatitis. Cause unknown. Labs are trending better. Inpatient course: Discharge summary reviewed- see chart. Interval history/Current status: is feeling better. Jaundice resolved. Still tired    Review of Systems   Constitutional: Positive for fatigue. HENT: Negative. Eyes: Negative. Respiratory: Negative. Cardiovascular: Negative. Gastrointestinal: Negative. Musculoskeletal: Negative. Skin: Negative. Neurological: Negative. Vitals:    07/23/21 0940   BP: 120/80   Site: Left Upper Arm   Position: Sitting   Cuff Size: Large Adult   Pulse: 73   Resp: 16   Temp: 97.1 °F (36.2 °C)   TempSrc: Temporal   SpO2: 96%   Weight: 245 lb 12.8 oz (111.5 kg)   Height: 5' 7.99\" (1.727 m)     Body mass index is 37.38 kg/m². Wt Readings from Last 3 Encounters:   07/23/21 245 lb 12.8 oz (111.5 kg)   07/19/21 242 lb 8 oz (110 kg)   07/11/21 240 lb (108.9 kg)     BP Readings from Last 3 Encounters:   07/23/21 120/80   07/19/21 124/73   07/11/21 (!) 140/82       Physical Exam  Constitutional:       Appearance: He is well-developed. HENT:      Head: Normocephalic. Right Ear: Tympanic membrane and external ear normal.      Left Ear: Tympanic membrane and external ear normal.      Nose: Nose normal.   Cardiovascular:      Rate and Rhythm: Normal rate and regular rhythm. Heart sounds: Normal heart sounds. No murmur heard. No friction rub. No gallop. Pulmonary:      Effort: Pulmonary effort is normal.      Breath sounds: Normal breath sounds. No wheezing or rales. Abdominal:      General: Bowel sounds are normal.      Palpations: Abdomen is soft. Tenderness: There is no abdominal tenderness. There is no guarding. Musculoskeletal:         General: Normal range of motion. Cervical back: Normal range of motion and neck supple. Lymphadenopathy:      Cervical: No cervical adenopathy. Skin:     General: Skin is warm. Neurological:      Mental Status: He is alert and oriented to person, place, and time.       Deep Tendon Reflexes: Reflexes are normal and symmetric. Assessment/Plan:  1. Hepatitis  Will cont to check labs until normal  Pt has fu with gi  - CBC Auto Differential; Standing  - Comprehensive Metabolic Panel; Standing  - [place] DISCHARGE MEDS RECONCILED W/ CURRENT OUTPATIENT MED LIST    2. Elevated LFTs      3.  Thrombocytopenia ([facility] [address]  resolved        Medical Decision Making: high complexity

## 2021-07-28 ENCOUNTER — NURSE ONLY (OUTPATIENT)
Dept: LAB | Age: 31
End: 2021-07-28

## 2021-07-28 DIAGNOSIS — R17 JAUNDICE, NON-NEONATAL: ICD-10-CM

## 2021-07-28 DIAGNOSIS — R79.89 ELEVATED LFTS: ICD-10-CM

## 2021-07-28 DIAGNOSIS — D72.819 LEUKOPENIA, UNSPECIFIED TYPE: ICD-10-CM

## 2021-07-28 DIAGNOSIS — K75.9 HEPATITIS: ICD-10-CM

## 2021-07-28 LAB
ALBUMIN SERPL-MCNC: 4.5 G/DL (ref 3.5–5.1)
ALP BLD-CCNC: 108 U/L (ref 38–126)
ALT SERPL-CCNC: 92 U/L (ref 11–66)
ANION GAP SERPL CALCULATED.3IONS-SCNC: 10 MEQ/L (ref 8–16)
AST SERPL-CCNC: 36 U/L (ref 5–40)
BASOPHILS # BLD: 1.3 %
BASOPHILS ABSOLUTE: 0.1 THOU/MM3 (ref 0–0.1)
BILIRUB SERPL-MCNC: 1.3 MG/DL (ref 0.3–1.2)
BILIRUBIN DIRECT: 0.5 MG/DL (ref 0–0.3)
BUN BLDV-MCNC: 12 MG/DL (ref 7–22)
CALCIUM SERPL-MCNC: 9.4 MG/DL (ref 8.5–10.5)
CHLORIDE BLD-SCNC: 103 MEQ/L (ref 98–111)
CO2: 25 MEQ/L (ref 23–33)
CREAT SERPL-MCNC: 1 MG/DL (ref 0.4–1.2)
EOSINOPHIL # BLD: 1.5 %
EOSINOPHILS ABSOLUTE: 0.1 THOU/MM3 (ref 0–0.4)
ERYTHROCYTE [DISTWIDTH] IN BLOOD BY AUTOMATED COUNT: 12.6 % (ref 11.5–14.5)
ERYTHROCYTE [DISTWIDTH] IN BLOOD BY AUTOMATED COUNT: 40 FL (ref 35–45)
GFR SERPL CREATININE-BSD FRML MDRD: 86 ML/MIN/1.73M2
GLUCOSE BLD-MCNC: 94 MG/DL (ref 70–108)
HCT VFR BLD CALC: 46.7 % (ref 42–52)
HEMOGLOBIN: 15.6 GM/DL (ref 14–18)
IMMATURE GRANS (ABS): 0.05 THOU/MM3 (ref 0–0.07)
IMMATURE GRANULOCYTES: 1.1 %
LYMPHOCYTES # BLD: 44.8 %
LYMPHOCYTES ABSOLUTE: 2.1 THOU/MM3 (ref 1–4.8)
MCH RBC QN AUTO: 29.4 PG (ref 26–33)
MCHC RBC AUTO-ENTMCNC: 33.4 GM/DL (ref 32.2–35.5)
MCV RBC AUTO: 87.9 FL (ref 80–94)
MONOCYTES # BLD: 15.9 %
MONOCYTES ABSOLUTE: 0.7 THOU/MM3 (ref 0.4–1.3)
NUCLEATED RED BLOOD CELLS: 0 /100 WBC
PLATELET # BLD: 410 THOU/MM3 (ref 130–400)
PMV BLD AUTO: 10.5 FL (ref 9.4–12.4)
POTASSIUM SERPL-SCNC: 4.4 MEQ/L (ref 3.5–5.2)
RBC # BLD: 5.31 MILL/MM3 (ref 4.7–6.1)
SEG NEUTROPHILS: 35.4 %
SEGMENTED NEUTROPHILS ABSOLUTE COUNT: 1.6 THOU/MM3 (ref 1.8–7.7)
SODIUM BLD-SCNC: 138 MEQ/L (ref 135–145)
TOTAL PROTEIN: 6.8 G/DL (ref 6.1–8)
WBC # BLD: 4.6 THOU/MM3 (ref 4.8–10.8)

## 2021-08-12 ENCOUNTER — NURSE ONLY (OUTPATIENT)
Dept: LAB | Age: 31
End: 2021-08-12

## 2021-08-12 DIAGNOSIS — K75.9 HEPATITIS: ICD-10-CM

## 2021-08-12 LAB
ALBUMIN SERPL-MCNC: 4.4 G/DL (ref 3.5–5.1)
ALP BLD-CCNC: 65 U/L (ref 38–126)
ALT SERPL-CCNC: 36 U/L (ref 11–66)
ANION GAP SERPL CALCULATED.3IONS-SCNC: 9 MEQ/L (ref 8–16)
AST SERPL-CCNC: 23 U/L (ref 5–40)
BASOPHILS # BLD: 0.8 %
BASOPHILS ABSOLUTE: 0 THOU/MM3 (ref 0–0.1)
BILIRUB SERPL-MCNC: 0.6 MG/DL (ref 0.3–1.2)
BUN BLDV-MCNC: 6 MG/DL (ref 7–22)
CALCIUM SERPL-MCNC: 9.2 MG/DL (ref 8.5–10.5)
CHLORIDE BLD-SCNC: 103 MEQ/L (ref 98–111)
CO2: 26 MEQ/L (ref 23–33)
CREAT SERPL-MCNC: 0.7 MG/DL (ref 0.4–1.2)
EOSINOPHIL # BLD: 3.9 %
EOSINOPHILS ABSOLUTE: 0.2 THOU/MM3 (ref 0–0.4)
ERYTHROCYTE [DISTWIDTH] IN BLOOD BY AUTOMATED COUNT: 13 % (ref 11.5–14.5)
ERYTHROCYTE [DISTWIDTH] IN BLOOD BY AUTOMATED COUNT: 41.8 FL (ref 35–45)
GFR SERPL CREATININE-BSD FRML MDRD: > 90 ML/MIN/1.73M2
GLUCOSE BLD-MCNC: 87 MG/DL (ref 70–108)
HCT VFR BLD CALC: 50.1 % (ref 42–52)
HEMOGLOBIN: 16.8 GM/DL (ref 14–18)
IMMATURE GRANS (ABS): 0.03 THOU/MM3 (ref 0–0.07)
IMMATURE GRANULOCYTES: 0.6 %
LYMPHOCYTES # BLD: 35.1 %
LYMPHOCYTES ABSOLUTE: 1.8 THOU/MM3 (ref 1–4.8)
MCH RBC QN AUTO: 29.6 PG (ref 26–33)
MCHC RBC AUTO-ENTMCNC: 33.5 GM/DL (ref 32.2–35.5)
MCV RBC AUTO: 88.2 FL (ref 80–94)
MONOCYTES # BLD: 12.1 %
MONOCYTES ABSOLUTE: 0.6 THOU/MM3 (ref 0.4–1.3)
NUCLEATED RED BLOOD CELLS: 0 /100 WBC
PLATELET # BLD: 217 THOU/MM3 (ref 130–400)
PMV BLD AUTO: 11.7 FL (ref 9.4–12.4)
POTASSIUM SERPL-SCNC: 3.9 MEQ/L (ref 3.5–5.2)
RBC # BLD: 5.68 MILL/MM3 (ref 4.7–6.1)
SEG NEUTROPHILS: 47.5 %
SEGMENTED NEUTROPHILS ABSOLUTE COUNT: 2.4 THOU/MM3 (ref 1.8–7.7)
SODIUM BLD-SCNC: 138 MEQ/L (ref 135–145)
TOTAL PROTEIN: 6.8 G/DL (ref 6.1–8)
WBC # BLD: 5.1 THOU/MM3 (ref 4.8–10.8)

## 2022-08-11 ENCOUNTER — OFFICE VISIT (OUTPATIENT)
Dept: FAMILY MEDICINE CLINIC | Age: 32
End: 2022-08-11
Payer: COMMERCIAL

## 2022-08-11 VITALS
SYSTOLIC BLOOD PRESSURE: 130 MMHG | DIASTOLIC BLOOD PRESSURE: 86 MMHG | HEIGHT: 68 IN | HEART RATE: 73 BPM | BODY MASS INDEX: 37.74 KG/M2 | TEMPERATURE: 98 F | RESPIRATION RATE: 16 BRPM | WEIGHT: 249 LBS | OXYGEN SATURATION: 97 %

## 2022-08-11 DIAGNOSIS — U07.1 COVID-19: ICD-10-CM

## 2022-08-11 DIAGNOSIS — R05.9 COUGH: Primary | ICD-10-CM

## 2022-08-11 LAB
Lab: ABNORMAL
QC PASS/FAIL: ABNORMAL
SARS-COV-2 RDRP RESP QL NAA+PROBE: POSITIVE

## 2022-08-11 PROCEDURE — 87635 SARS-COV-2 COVID-19 AMP PRB: CPT | Performed by: NURSE PRACTITIONER

## 2022-08-11 PROCEDURE — 99213 OFFICE O/P EST LOW 20 MIN: CPT | Performed by: NURSE PRACTITIONER

## 2022-08-11 RX ORDER — DEXTROMETHORPHAN HYDROBROMIDE AND PROMETHAZINE HYDROCHLORIDE 15; 6.25 MG/5ML; MG/5ML
5 SYRUP ORAL 4 TIMES DAILY PRN
Qty: 240 ML | Refills: 0 | Status: SHIPPED | OUTPATIENT
Start: 2022-08-11 | End: 2022-08-18

## 2022-08-11 RX ORDER — ALBUTEROL SULFATE 90 UG/1
2 AEROSOL, METERED RESPIRATORY (INHALATION) EVERY 6 HOURS PRN
Qty: 18 G | Refills: 0 | Status: SHIPPED | OUTPATIENT
Start: 2022-08-11 | End: 2023-08-11

## 2022-08-11 SDOH — ECONOMIC STABILITY: FOOD INSECURITY: WITHIN THE PAST 12 MONTHS, YOU WORRIED THAT YOUR FOOD WOULD RUN OUT BEFORE YOU GOT MONEY TO BUY MORE.: NEVER TRUE

## 2022-08-11 SDOH — ECONOMIC STABILITY: FOOD INSECURITY: WITHIN THE PAST 12 MONTHS, THE FOOD YOU BOUGHT JUST DIDN'T LAST AND YOU DIDN'T HAVE MONEY TO GET MORE.: NEVER TRUE

## 2022-08-11 ASSESSMENT — ENCOUNTER SYMPTOMS
WHEEZING: 1
CHEST TIGHTNESS: 1
COUGH: 1

## 2022-08-11 ASSESSMENT — PATIENT HEALTH QUESTIONNAIRE - PHQ9
SUM OF ALL RESPONSES TO PHQ QUESTIONS 1-9: 0
SUM OF ALL RESPONSES TO PHQ QUESTIONS 1-9: 0
2. FEELING DOWN, DEPRESSED OR HOPELESS: 0
1. LITTLE INTEREST OR PLEASURE IN DOING THINGS: 0
SUM OF ALL RESPONSES TO PHQ9 QUESTIONS 1 & 2: 0
SUM OF ALL RESPONSES TO PHQ QUESTIONS 1-9: 0
SUM OF ALL RESPONSES TO PHQ QUESTIONS 1-9: 0

## 2022-08-11 ASSESSMENT — SOCIAL DETERMINANTS OF HEALTH (SDOH): HOW HARD IS IT FOR YOU TO PAY FOR THE VERY BASICS LIKE FOOD, HOUSING, MEDICAL CARE, AND HEATING?: NOT HARD AT ALL

## 2022-08-11 NOTE — PROGRESS NOTES
Brittany Rabago is a 34 y.o. male whopresents today for :  Chief Complaint   Patient presents with    Cough       HPI:     HPI  Cough, lightheaded for 2 days. Pt is covid positive. Feeling some better today  chest does feel tight     Patient Active Problem List   Diagnosis    Elevated LFTs    Thrombocytopenia (HCC)    Leukopenia    Jaundice, non-        Past Medical History:   Diagnosis Date    Hepatitis 7/15/2021      Past Surgical History:   Procedure Laterality Date    SINUS SURGERY      TONSILLECTOMY       Family History   Problem Relation Age of Onset    Diabetes Mother     Cancer Paternal Grandfather      Social History     Tobacco Use    Smoking status: Never    Smokeless tobacco: Current   Substance Use Topics    Alcohol use: Not on file      Current Outpatient Medications   Medication Sig Dispense Refill    albuterol sulfate HFA (PROVENTIL;VENTOLIN;PROAIR) 108 (90 Base) MCG/ACT inhaler Inhale 2 puffs into the lungs every 6 hours as needed for Wheezing 18 g 0    promethazine-dextromethorphan (PROMETHAZINE-DM) 6.25-15 MG/5ML syrup Take 5 mLs by mouth 4 times daily as needed for Cough 240 mL 0    pantoprazole (PROTONIX) 40 MG tablet Take 1 tablet by mouth every morning (before breakfast) (Patient not taking: Reported on 2022) 30 tablet 0    ondansetron (ZOFRAN-ODT) 4 MG disintegrating tablet Take 1 tablet by mouth every 8 hours as needed for Nausea or Vomiting (Patient not taking: Reported on 2022) 15 tablet 1     No current facility-administered medications for this visit.      No Known Allergies  Health Maintenance   Topic Date Due    COVID-19 Vaccine (1) Never done    Varicella vaccine (1 of 2 - 2-dose childhood series) Never done    DTaP/Tdap/Td vaccine (1 - Tdap) Never done    Depression Screen  2022    Flu vaccine (1) 2022    Hepatitis C screen  Completed    HIV screen  Completed    Hepatitis A vaccine  Aged Out    Hepatitis B vaccine  Aged Out    Hib vaccine  Aged Out Meningococcal (ACWY) vaccine  Aged Out    Pneumococcal 0-64 years Vaccine  Aged Out       Subjective:     Review of Systems   Respiratory:  Positive for cough, chest tightness and wheezing. Objective:     Vitals:    08/11/22 1426   BP: 130/86   Site: Left Upper Arm   Position: Sitting   Cuff Size: Medium Adult   Pulse: 73   Resp: 16   Temp: 98 °F (36.7 °C)   TempSrc: Temporal   SpO2: 97%   Weight: 249 lb (112.9 kg)   Height: 5' 7.99\" (1.727 m)       Physical Exam  Constitutional:       Appearance: He is well-developed. HENT:      Head: Normocephalic. Right Ear: Tympanic membrane and external ear normal.      Left Ear: Tympanic membrane and external ear normal.      Nose: Nose normal.   Cardiovascular:      Rate and Rhythm: Normal rate and regular rhythm. Heart sounds: Normal heart sounds. No murmur heard. No friction rub. No gallop. Pulmonary:      Effort: Pulmonary effort is normal.      Breath sounds: Normal breath sounds. No wheezing or rales. Abdominal:      General: Bowel sounds are normal.      Palpations: Abdomen is soft. Tenderness: There is no abdominal tenderness. There is no guarding. Musculoskeletal:         General: Normal range of motion. Cervical back: Normal range of motion and neck supple. Lymphadenopathy:      Cervical: No cervical adenopathy. Skin:     General: Skin is warm. Neurological:      Mental Status: He is alert and oriented to person, place, and time. Deep Tendon Reflexes: Reflexes are normal and symmetric. Assessment:      Diagnosis Orders   1. Cough  POCT COVID-19 Rapid, NAAT      2. COVID-19  albuterol sulfate HFA (PROVENTIL;VENTOLIN;PROAIR) 108 (90 Base) MCG/ACT inhaler    promethazine-dextromethorphan (PROMETHAZINE-DM) 6.25-15 MG/5ML syrup          Plan:      No follow-ups on file. Orders Placed This Encounter   Procedures    POCT COVID-19 Rapid, NAAT     Order Specific Question:   Is this test for diagnosis or screening? Answer:   Diagnosis of ill patient     Order Specific Question:   Symptomatic for COVID-19 as defined by CDC? Answer:   Yes     Order Specific Question:   Date of Symptom Onset     Answer:   8/9/2022     Order Specific Question:   Hospitalized for COVID-19? Answer:   No     Order Specific Question:   Admitted to ICU for COVID-19? Answer:   No     Order Specific Question:   Employed in healthcare setting? Answer:   No     Order Specific Question:   Resident in a congregate (group) care setting? Answer:   No     Order Specific Question:   Pregnant: Answer:   No     Order Specific Question:   Previously tested for COVID-19? Answer:   Unknown     Orders Placed This Encounter   Medications    albuterol sulfate HFA (PROVENTIL;VENTOLIN;PROAIR) 108 (90 Base) MCG/ACT inhaler     Sig: Inhale 2 puffs into the lungs every 6 hours as needed for Wheezing     Dispense:  18 g     Refill:  0    promethazine-dextromethorphan (PROMETHAZINE-DM) 6.25-15 MG/5ML syrup     Sig: Take 5 mLs by mouth 4 times daily as needed for Cough     Dispense:  240 mL     Refill:  0      Pt does not meet criteria for paxlovid. Push fluids, stay active. See orders  Advised to call back directly if there are further questions, or if these symptoms fail to improve as anticipated or worsen. Patient given educational materials - seepatient instructions. Discussed use, benefit, and side effects of prescribed medications. All patient questions answered. Pt voiced understanding. Patient agreed withtreatment plan. Follow up as directed.      Electronically signed by MURIEL Bueno CNP on 8/11/2022 at 5:05 PM

## 2022-10-13 ENCOUNTER — TELEPHONE (OUTPATIENT)
Dept: FAMILY MEDICINE CLINIC | Age: 32
End: 2022-10-13

## 2022-10-13 RX ORDER — KETOCONAZOLE 20 MG/G
CREAM TOPICAL
Qty: 30 G | Refills: 1 | Status: SHIPPED | OUTPATIENT
Start: 2022-10-13

## 2022-10-13 NOTE — TELEPHONE ENCOUNTER
----- Message from [facility] sent at 10/13/2022  2:53 PM EDT -----  Subject: Message to Provider    QUESTIONS  Information for Provider? Denys's wife has a bacterial and yeast   infection. He is pretty sure he has it too. He has a rash on his penis. He   would like to know if Bishop Salas would call him in something for it to   [facility] on [address] in [place]. Please call with any questions or concerns and   to let him know when it has been called in.   ---------------------------------------------------------------------------  --------------  [id number]  [phone]; OK to leave message on voicemail  ---------------------------------------------------------------------------  --------------  SCRIPT ANSWERS  Relationship to Patient?  Self

## 2023-11-02 ENCOUNTER — TELEPHONE (OUTPATIENT)
Dept: FAMILY MEDICINE CLINIC | Age: 33
End: 2023-11-02

## 2023-11-02 NOTE — TELEPHONE ENCOUNTER
Attempted to call pt VM FULL to get more information   Pt left VM that he is not feeling well, has cough, throwing up and diarrhea.

## 2023-11-06 ENCOUNTER — OFFICE VISIT (OUTPATIENT)
Dept: FAMILY MEDICINE CLINIC | Age: 33
End: 2023-11-06
Payer: COMMERCIAL

## 2023-11-06 VITALS
OXYGEN SATURATION: 95 % | WEIGHT: 246 LBS | BODY MASS INDEX: 37.28 KG/M2 | DIASTOLIC BLOOD PRESSURE: 74 MMHG | RESPIRATION RATE: 16 BRPM | TEMPERATURE: 98 F | HEIGHT: 68 IN | SYSTOLIC BLOOD PRESSURE: 110 MMHG | HEART RATE: 62 BPM

## 2023-11-06 DIAGNOSIS — J18.9 PNEUMONIA OF RIGHT LOWER LOBE DUE TO INFECTIOUS ORGANISM: Primary | ICD-10-CM

## 2023-11-06 DIAGNOSIS — D69.6 THROMBOCYTOPENIA (HCC): ICD-10-CM

## 2023-11-06 DIAGNOSIS — D70.9 NEUTROPENIA, UNSPECIFIED TYPE (HCC): ICD-10-CM

## 2023-11-06 PROCEDURE — 99213 OFFICE O/P EST LOW 20 MIN: CPT | Performed by: NURSE PRACTITIONER

## 2023-11-06 RX ORDER — LEVOFLOXACIN 500 MG/1
500 TABLET, FILM COATED ORAL DAILY
Qty: 7 TABLET | Refills: 0 | Status: SHIPPED | OUTPATIENT
Start: 2023-11-06 | End: 2023-11-13

## 2023-11-06 RX ORDER — ALBUTEROL SULFATE 90 UG/1
2 AEROSOL, METERED RESPIRATORY (INHALATION) EVERY 6 HOURS PRN
Qty: 18 G | Refills: 0 | Status: SHIPPED | OUTPATIENT
Start: 2023-11-06 | End: 2024-11-05

## 2023-11-06 SDOH — ECONOMIC STABILITY: FOOD INSECURITY: WITHIN THE PAST 12 MONTHS, THE FOOD YOU BOUGHT JUST DIDN'T LAST AND YOU DIDN'T HAVE MONEY TO GET MORE.: NEVER TRUE

## 2023-11-06 SDOH — ECONOMIC STABILITY: INCOME INSECURITY: HOW HARD IS IT FOR YOU TO PAY FOR THE VERY BASICS LIKE FOOD, HOUSING, MEDICAL CARE, AND HEATING?: NOT HARD AT ALL

## 2023-11-06 SDOH — ECONOMIC STABILITY: HOUSING INSECURITY
IN THE LAST 12 MONTHS, WAS THERE A TIME WHEN YOU DID NOT HAVE A STEADY PLACE TO SLEEP OR SLEPT IN A SHELTER (INCLUDING NOW)?: NO

## 2023-11-06 SDOH — ECONOMIC STABILITY: FOOD INSECURITY: WITHIN THE PAST 12 MONTHS, YOU WORRIED THAT YOUR FOOD WOULD RUN OUT BEFORE YOU GOT MONEY TO BUY MORE.: NEVER TRUE

## 2023-11-06 ASSESSMENT — ENCOUNTER SYMPTOMS
COUGH: 1
GASTROINTESTINAL NEGATIVE: 1
EYES NEGATIVE: 1

## 2023-11-06 ASSESSMENT — PATIENT HEALTH QUESTIONNAIRE - PHQ9
SUM OF ALL RESPONSES TO PHQ QUESTIONS 1-9: 0
SUM OF ALL RESPONSES TO PHQ QUESTIONS 1-9: 0
1. LITTLE INTEREST OR PLEASURE IN DOING THINGS: 0
SUM OF ALL RESPONSES TO PHQ QUESTIONS 1-9: 0
SUM OF ALL RESPONSES TO PHQ9 QUESTIONS 1 & 2: 0
2. FEELING DOWN, DEPRESSED OR HOPELESS: 0
SUM OF ALL RESPONSES TO PHQ QUESTIONS 1-9: 0

## 2023-11-06 NOTE — PROGRESS NOTES
Cervical: No cervical adenopathy. Skin:     General: Skin is warm. Neurological:      Mental Status: He is alert and oriented to person, place, and time. Deep Tendon Reflexes: Reflexes are normal and symmetric.           :      Diagnosis Orders   1. Pneumonia of right lower lobe due to infectious organism  levoFLOXacin (LEVAQUIN) 500 MG tablet    albuterol sulfate HFA (PROVENTIL;VENTOLIN;PROAIR) 108 (90 Base) MCG/ACT inhaler      2. Neutropenia, unspecified type ([address])        3. Thrombocytopenia ([address])            :      No follow-ups on file. Diagnosis Orders   1. Pneumonia of right lower lobe due to infectious organism  levoFLOXacin (LEVAQUIN) 500 MG tablet    albuterol sulfate HFA (PROVENTIL;VENTOLIN;PROAIR) 108 (90 Base) MCG/ACT inhaler      2. Neutropenia, unspecified type ([address])        3. Thrombocytopenia ([address])            No orders of the defined types were placed in this encounter. Orders Placed This Encounter   Medications    levoFLOXacin (LEVAQUIN) 500 MG tablet     Sig: Take 1 tablet by mouth daily for 7 days     Dispense:  7 tablet     Refill:  0    albuterol sulfate HFA (PROVENTIL;VENTOLIN;PROAIR) 108 (90 Base) MCG/ACT inhaler     Sig: Inhale 2 puffs into the lungs every 6 hours as needed for Wheezing     Dispense:  18 g     Refill:  0      See orders  If sob chills rigors notify asap      Patient given educational materials - seepatient instructions. Discussed use, benefit, and side effects of prescribed medications. All patient questions answered. Pt voiced understanding. Patient agreed withtreatment plan. Follow up as directed.      Electronically signed by MURIEL Bañuelos CNP on 11/6/2023 at 12:31 PM

## 2023-11-16 ENCOUNTER — TELEPHONE (OUTPATIENT)
Dept: FAMILY MEDICINE CLINIC | Age: 33
End: 2023-11-16

## 2023-11-16 RX ORDER — PREDNISONE 5 MG/1
TABLET ORAL
Qty: 45 TABLET | Refills: 0 | Status: SHIPPED | OUTPATIENT
Start: 2023-11-16 | End: 2023-11-26

## 2023-11-16 NOTE — TELEPHONE ENCOUNTER
Prednisone called in. Pt just finished antibiotic for pneumonia.   Is he sure its not a drug reaction

## 2023-11-16 NOTE — TELEPHONE ENCOUNTER
Pt states he started 11/13 with rash-arms, chest, face, lower back and butt crack. He thinks it might be poison ivy.  C/o itching on lower back and right forearm    Denies using any medications    He and his dad (who has an appt with Saurav today for same rash) were working in [place] over the weekend    [employer]

## 2023-11-16 NOTE — TELEPHONE ENCOUNTER
Notified pt. He stated his dad has the same rash and they were working in the same area. He's pretty sure its poison ivy.

## 2024-04-30 ENCOUNTER — OFFICE VISIT (OUTPATIENT)
Dept: FAMILY MEDICINE CLINIC | Age: 34
End: 2024-04-30
Payer: COMMERCIAL

## 2024-04-30 ENCOUNTER — NURSE ONLY (OUTPATIENT)
Dept: LAB | Age: 34
End: 2024-04-30

## 2024-04-30 VITALS
BODY MASS INDEX: 37.59 KG/M2 | SYSTOLIC BLOOD PRESSURE: 126 MMHG | DIASTOLIC BLOOD PRESSURE: 82 MMHG | RESPIRATION RATE: 16 BRPM | OXYGEN SATURATION: 98 % | HEIGHT: 68 IN | HEART RATE: 68 BPM | TEMPERATURE: 98.8 F | WEIGHT: 248 LBS

## 2024-04-30 DIAGNOSIS — R16.0 HEPATOMEGALY: ICD-10-CM

## 2024-04-30 DIAGNOSIS — D70.9 NEUTROPENIA, UNSPECIFIED TYPE (HCC): ICD-10-CM

## 2024-04-30 DIAGNOSIS — K43.9 VENTRAL HERNIA WITHOUT OBSTRUCTION OR GANGRENE: ICD-10-CM

## 2024-04-30 DIAGNOSIS — D69.6 THROMBOCYTOPENIA (HCC): ICD-10-CM

## 2024-04-30 DIAGNOSIS — K43.9 VENTRAL HERNIA WITHOUT OBSTRUCTION OR GANGRENE: Primary | ICD-10-CM

## 2024-04-30 LAB
BASOPHILS ABSOLUTE: 0 THOU/MM3 (ref 0–0.1)
BASOPHILS NFR BLD AUTO: 0.7 %
DEPRECATED RDW RBC AUTO: 39.7 FL (ref 35–45)
EOSINOPHIL NFR BLD AUTO: 2 %
EOSINOPHILS ABSOLUTE: 0.1 THOU/MM3 (ref 0–0.4)
ERYTHROCYTE [DISTWIDTH] IN BLOOD BY AUTOMATED COUNT: 12.6 % (ref 11.5–14.5)
HCT VFR BLD AUTO: 50.2 % (ref 42–52)
HGB BLD-MCNC: 17.8 GM/DL (ref 14–18)
IMM GRANULOCYTES # BLD AUTO: 0.03 THOU/MM3 (ref 0–0.07)
IMM GRANULOCYTES NFR BLD AUTO: 0.6 %
LYMPHOCYTES ABSOLUTE: 1.6 THOU/MM3 (ref 1–4.8)
LYMPHOCYTES NFR BLD AUTO: 30.2 %
MCH RBC QN AUTO: 31 PG (ref 26–33)
MCHC RBC AUTO-ENTMCNC: 35.5 GM/DL (ref 32.2–35.5)
MCV RBC AUTO: 87.5 FL (ref 80–94)
MONOCYTES ABSOLUTE: 0.6 THOU/MM3 (ref 0.4–1.3)
MONOCYTES NFR BLD AUTO: 11.1 %
NEUTROPHILS ABSOLUTE: 3 THOU/MM3 (ref 1.8–7.7)
NEUTROPHILS NFR BLD AUTO: 55.4 %
NRBC BLD AUTO-RTO: 0 /100 WBC
PLATELET # BLD AUTO: 250 THOU/MM3 (ref 130–400)
PMV BLD AUTO: 11.2 FL (ref 9.4–12.4)
RBC # BLD AUTO: 5.74 MILL/MM3 (ref 4.7–6.1)
WBC # BLD AUTO: 5.4 THOU/MM3 (ref 4.8–10.8)

## 2024-04-30 PROCEDURE — 99214 OFFICE O/P EST MOD 30 MIN: CPT | Performed by: NURSE PRACTITIONER

## 2024-04-30 ASSESSMENT — ENCOUNTER SYMPTOMS
SORE THROAT: 1
RESPIRATORY NEGATIVE: 1
EYES NEGATIVE: 1
ABDOMINAL PAIN: 1
ABDOMINAL DISTENTION: 1

## 2024-04-30 ASSESSMENT — PATIENT HEALTH QUESTIONNAIRE - PHQ9
1. LITTLE INTEREST OR PLEASURE IN DOING THINGS: NOT AT ALL
SUM OF ALL RESPONSES TO PHQ QUESTIONS 1-9: 0
SUM OF ALL RESPONSES TO PHQ9 QUESTIONS 1 & 2: 0
2. FEELING DOWN, DEPRESSED OR HOPELESS: NOT AT ALL
SUM OF ALL RESPONSES TO PHQ QUESTIONS 1-9: 0

## 2024-04-30 NOTE — PROGRESS NOTES
Denys Schumacher is a 33 y.o. male whopresents today for :  Chief Complaint   Patient presents with    Abdominal Pain     Started 2 months ago     Pharyngitis     Started about 2 weeks ago      Vitals:    24 1040   BP: 126/82   Pulse: 68   Resp: 16   Temp: 98.8 °F (37.1 °C)   SpO2: 98%       HPI:     HPI  2 months.  Pain in RLQ.  Will come and last about 15 minutes.  Will be dull.  No trigger.  States stools were very pale when first started but are now improved.  Patient does have a history of viral hepatitis.  Denies having any recent jaundice.  He also has a sore throat for the past 2 weeks.  He does report his been having a significant amount of reflux.  He attributes this to he is not using tobacco pouches and he is swallowing and tobacco use quite often.  Patient Active Problem List   Diagnosis    Elevated LFTs    Thrombocytopenia (HCC)    Leukopenia    Jaundice, non-    Neutropenia, unspecified type (HCC)     Past Medical History:   Diagnosis Date    Hepatitis 7/15/2021      Past Surgical History:   Procedure Laterality Date    SINUS SURGERY      TONSILLECTOMY       Family History   Problem Relation Age of Onset    Diabetes Mother     Cancer Paternal Grandfather      Social History     Tobacco Use    Smoking status: Never    Smokeless tobacco: Current   Substance Use Topics    Alcohol use: Not on file      Current Outpatient Medications   Medication Sig Dispense Refill    albuterol sulfate HFA (PROVENTIL;VENTOLIN;PROAIR) 108 (90 Base) MCG/ACT inhaler Inhale 2 puffs into the lungs every 6 hours as needed for Wheezing 18 g 0     No current facility-administered medications for this visit.     No Known Allergies  Health Maintenance   Topic Date Due    Hepatitis B vaccine (1 of 3 - 3-dose series) Never done    COVID-19 Vaccine (1) Never done    Varicella vaccine (1 of 2 - 2-dose childhood series) Never done    DTaP/Tdap/Td vaccine (1 - Tdap) Never done    Flu vaccine (Season Ended) 2024

## 2024-05-01 LAB
ALBUMIN SERPL BCG-MCNC: 4.9 G/DL (ref 3.5–5.1)
ALP SERPL-CCNC: 66 U/L (ref 38–126)
ALT SERPL W/O P-5'-P-CCNC: 19 U/L (ref 11–66)
ANION GAP SERPL CALC-SCNC: 12 MEQ/L (ref 8–16)
AST SERPL-CCNC: 19 U/L (ref 5–40)
BILIRUB SERPL-MCNC: 0.7 MG/DL (ref 0.3–1.2)
BUN SERPL-MCNC: 10 MG/DL (ref 7–22)
CALCIUM SERPL-MCNC: 9.7 MG/DL (ref 8.5–10.5)
CHLORIDE SERPL-SCNC: 103 MEQ/L (ref 98–111)
CO2 SERPL-SCNC: 25 MEQ/L (ref 23–33)
CREAT SERPL-MCNC: 0.8 MG/DL (ref 0.4–1.2)
GFR SERPL CREATININE-BSD FRML MDRD: > 90 ML/MIN/1.73M2
GLUCOSE SERPL-MCNC: 84 MG/DL (ref 70–108)
POTASSIUM SERPL-SCNC: 4.3 MEQ/L (ref 3.5–5.2)
PROT SERPL-MCNC: 7.6 G/DL (ref 6.1–8)
SODIUM SERPL-SCNC: 140 MEQ/L (ref 135–145)

## 2024-05-03 LAB — EPSTEIN-BARR VIRUS ANTIBODIES: NORMAL

## 2024-05-06 ENCOUNTER — HOSPITAL ENCOUNTER (OUTPATIENT)
Dept: CT IMAGING | Age: 34
Discharge: HOME | End: 2024-05-06
Payer: COMMERCIAL

## 2024-05-06 DIAGNOSIS — R16.0 HEPATOMEGALY: ICD-10-CM

## 2024-05-06 DIAGNOSIS — K43.9 VENTRAL HERNIA WITHOUT OBSTRUCTION OR GANGRENE: ICD-10-CM

## 2024-05-06 PROCEDURE — 6360000004 HC RX CONTRAST MEDICATION: Performed by: NURSE PRACTITIONER

## 2024-05-06 PROCEDURE — 74177 CT ABD & PELVIS W/CONTRAST: CPT

## 2024-05-06 RX ADMIN — IOPAMIDOL 100 ML: 755 INJECTION, SOLUTION INTRAVENOUS at 07:24
